# Patient Record
Sex: FEMALE | Race: BLACK OR AFRICAN AMERICAN | Employment: FULL TIME | URBAN - METROPOLITAN AREA
[De-identification: names, ages, dates, MRNs, and addresses within clinical notes are randomized per-mention and may not be internally consistent; named-entity substitution may affect disease eponyms.]

---

## 2017-06-05 RX ORDER — HYDROCHLOROTHIAZIDE 12.5 MG/1
CAPSULE ORAL
Qty: 90 CAP | Refills: 3 | Status: SHIPPED | OUTPATIENT
Start: 2017-06-05 | End: 2018-06-07 | Stop reason: SDUPTHER

## 2017-06-05 RX ORDER — METFORMIN HYDROCHLORIDE 500 MG/1
TABLET, EXTENDED RELEASE ORAL
Qty: 270 TAB | Refills: 3 | Status: SHIPPED | OUTPATIENT
Start: 2017-06-05 | End: 2018-07-06 | Stop reason: SDUPTHER

## 2017-06-05 RX ORDER — SPIRONOLACTONE 100 MG/1
TABLET, FILM COATED ORAL
Qty: 90 TAB | Refills: 3 | Status: SHIPPED | OUTPATIENT
Start: 2017-06-05 | End: 2018-06-17 | Stop reason: SDUPTHER

## 2017-09-01 DIAGNOSIS — E04.9 GOITER, NODULAR: ICD-10-CM

## 2017-09-01 DIAGNOSIS — R73.02 IMPAIRED GLUCOSE TOLERANCE: Primary | ICD-10-CM

## 2017-09-01 DIAGNOSIS — E78.5 HYPERLIPIDEMIA LDL GOAL <100: ICD-10-CM

## 2017-09-01 DIAGNOSIS — E28.2 PCOS (POLYCYSTIC OVARIAN SYNDROME): ICD-10-CM

## 2017-09-13 ENCOUNTER — OFFICE VISIT (OUTPATIENT)
Dept: ENDOCRINOLOGY | Age: 42
End: 2017-09-13

## 2017-09-13 VITALS
HEART RATE: 81 BPM | BODY MASS INDEX: 33.29 KG/M2 | HEIGHT: 65 IN | WEIGHT: 199.8 LBS | DIASTOLIC BLOOD PRESSURE: 88 MMHG | SYSTOLIC BLOOD PRESSURE: 141 MMHG

## 2017-09-13 DIAGNOSIS — E28.319 PREMATURE MENOPAUSE: ICD-10-CM

## 2017-09-13 DIAGNOSIS — E66.9 OBESITY (BMI 30-39.9): ICD-10-CM

## 2017-09-13 DIAGNOSIS — E78.5 HYPERLIPIDEMIA LDL GOAL <100: ICD-10-CM

## 2017-09-13 DIAGNOSIS — R94.6 ABNORMAL THYROID FUNCTION TEST: Primary | ICD-10-CM

## 2017-09-13 DIAGNOSIS — R73.02 IMPAIRED GLUCOSE TOLERANCE: ICD-10-CM

## 2017-09-13 DIAGNOSIS — E28.2 PCOS (POLYCYSTIC OVARIAN SYNDROME): ICD-10-CM

## 2017-09-13 RX ORDER — TOPIRAMATE 50 MG/1
TABLET, FILM COATED ORAL
Qty: 90 TAB | Refills: 3 | Status: SHIPPED | OUTPATIENT
Start: 2017-09-13 | End: 2018-10-31

## 2017-09-13 RX ORDER — PHENTERMINE HYDROCHLORIDE 37.5 MG/1
TABLET ORAL
Qty: 100 TAB | Refills: 1 | Status: SHIPPED | OUTPATIENT
Start: 2017-09-13 | End: 2018-10-31

## 2017-09-13 RX ORDER — MULTIVIT WITH MINERALS/HERBS
1 TABLET ORAL DAILY
COMMUNITY

## 2017-09-13 NOTE — PATIENT INSTRUCTIONS
1) Your Hemoglobin A1c is a 3 month marker of your blood sugar control. Normal is less than 5.7% and diabetes is greater than 6.4%. Your Hemoglobin A1c is 5.8% which means your blood sugar is still in the borderline diabetic range but under better control than last check when your value was 6.1%. Continue to work on your diet and exercise and take all your medications as directed. 2) Your LDL (bad cholesterol) is 138 down from 150. Try increasing the red yeast up to 3 tabs per day. 3) Your liver and kidney and thyroid are normal.    4) Your weight is down 9 lbs on our scales. Restart topamax 1 tab at bedtime and the phentermine 1 tab in the morning. 5) I will send you a reminder e-mail 3-4 weeks prior to your next visit and you will have the order already in the [facility] system so you can just go sometime in the 3-7 days before the next visit to have your labs drawn.

## 2017-09-13 NOTE — PROGRESS NOTES
Chief Complaint   Patient presents with    Thyroid Problem     pcp and pharmacy confirmed     History of Present Illness: Jeffrey Dunn is a 36 y.o. female here for follow up of thyroid. Weight down 9 lbs since last visit in 8/16 but was down 10 more lbs by 6/17 and has regained some of this back since then. Was taking a whole phentermine in the morning until she ran out in 8/17 and one whole tab of topamax at bedtime. Has been getting 3 tabs of metformin per day and 2x/week may get a 4th. Tolerating this well. Taking red yeast rice 2 tabs per day. Compliant with hctz and arthur. Still having some hair thinning. Still with some facial hair growth and acne that requires plucking every other day which is unchanged. Taking penny-pro and may have intermittent night sweats that sometimes can occur most days of the week. Just had labs drawn yesterday. Was aggravated today to explain her rise in BP as she has checked away from the office and seen reading under 297 systolic. 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 Current Outpatient Prescriptions   Medication Sig    hydroCHLOROthiazide (MICROZIDE) 12.5 mg capsule TAKE ONE CAPSULE BY MOUTH ONCE DAILY    spironolactone (ALDACTONE) 100 mg tablet TAKE ONE TABLET BY MOUTH ONCE DAILY. THIS IS A NEW HIGHER DOSE    metFORMIN ER (GLUCOPHAGE XR) 500 mg tablet TAKE THREE TABLETS BY MOUTH ONCE DAILY AT DINNER    PREMPRO 0.625-2.5 mg per tablet TAKE ONE TABLET BY MOUTH ONCE DAILY    hydrOXYzine (VISTARIL) 25 mg capsule Take 1 cap at bedtime as needed for sleep    red yeast rice extract 600 mg cap Take 1,200 mg by mouth daily.  OTHER,NON-FORMULARY, 1,000 mg. Flax seed    CALCIUM & MAGNESIUM CARBONATES PO Take  by mouth two (2) times a day.  aspirin (ASPIRIN) 325 mg tablet Take 325 mg by mouth daily.  amlodipine (NORVASC) 10 mg tablet Take  by mouth daily.  ASPIRIN/ACETAMINOPHEN/CAFFEINE (EXCEDRIN MIGRAINE PO) Take  by mouth as needed.     phentermine (ADIPEX-P) 37.5 mg tablet Take 1 tablet before breakfast    topiramate (TOPAMAX) 50 mg tablet Take 1 tablet at bedtime -- Dose change 8/24/16--updated med list--did not send prescription to the pharmacy     No current facility-administered medications for this visit. Allergies   Allergen Reactions    Macrobid [Nitrofurantoin Monohyd/M-Cryst] Rash and Itching     Review of Systems:  - Cardiovascular: no chest pain  - Neurological: no tremors  - Integumentary: skin is normal    Physical Examination:  Blood pressure 141/88, pulse 81, height 5' 5\" (1.651 m), weight 199 lb 12.8 oz (90.6 kg), last menstrual period 10/11/2012.  - General: pleasant, no distress, good eye contact   - Neck: small goiter, no carotid bruits  - Cardiovascular: regular, normal rate, nl s1 and s2, no m/r/g   - Respiratory: clear to auscultation bilaterally   - Integumentary: skin is normal, no edema  - Neurological: reflexes 2+ at biceps, no tremors  - Psychiatric: normal mood and affect    Data Reviewed:   Component      Latest Ref Rng & Units 9/12/2017 9/12/2017 9/12/2017 9/12/2017           9:12 AM  9:12 AM  9:12 AM  9:12 AM   Glucose      65 - 99 mg/dL  98     BUN      6 - 24 mg/dL  12     Creatinine      0.57 - 1.00 mg/dL  0.92     GFR est non-AA      >59 mL/min/1.73  77     GFR est AA      >59 mL/min/1.73  88     BUN/Creatinine ratio      9 - 23  13     Sodium      134 - 144 mmol/L  139     Potassium      3.5 - 5.2 mmol/L  4.3     Chloride      96 - 106 mmol/L  99     CO2      18 - 29 mmol/L  26     Calcium      8.7 - 10.2 mg/dL  9.5     Protein, total      6.0 - 8.5 g/dL  7.7     Albumin      3.5 - 5.5 g/dL  4.1     GLOBULIN, TOTAL      1.5 - 4.5 g/dL  3.6     A-G Ratio      1.2 - 2.2  1.1 (L)     Bilirubin, total      0.0 - 1.2 mg/dL  0.3     Alk.  phosphatase      39 - 117 IU/L  63     AST      0 - 40 IU/L  12     ALT (SGPT)      0 - 32 IU/L  12     Cholesterol, total      100 - 199 mg/dL   211 (H)    Triglyceride      0 - 149 mg/dL   82    HDL Cholesterol      >39 mg/dL   57    VLDL, calculated      5 - 40 mg/dL   16    LDL, calculated      0 - 99 mg/dL   138 (H)    Hemoglobin A1c, (calculated)      4.8 - 5.6 % 5.8 (H)      Estimated average glucose      mg/dL 120      TSH      0.450 - 4.500 uIU/mL    0.570     Component      Latest Ref Rng & Units 9/12/2017           9:12 AM   Testosterone, Serum (Total)      ng/dL 12.6         Assessment/Plan:     1. Abnormal thyroid function test: She had a low free T4 in the setting of a low TSH in August 2010 but on repeat her TSH was normal in Dec 2010 and July 2011. She had another low TSH of 0.37 in May 2012 but this was normal on repeat in August 2012 and 8/13. Then low again at 0.38 in 10/13. Up to 0.425 in 4/14 but down to 0.29 in 9/14. Up to 0.48 in 2/15 and 0.52 in 12/15 and 0.58 in 8/16 and 0.57 in 9/17. I think her low TSH values were either lab errors or possibly from stress rather than from hyperthyroidism as she doesn't have any symptoms of hyperthyroidism.   - repeat TSH prior to next visit      2. PCOS (polycystic ovarian syndrome): I thought a lot of her symptoms of irregular periods and hair growth may be from PCOS so I began treatment for this with Metformin in May 2012. She is tolerating this so far at 3 tabs daily so will stay at this dose. Increased the arthur from 25 to 50 daily in 8/12 to help with hair growth but this hasn't made too much difference so increased to 100 in 9/14 but still hasn't helped too much. Testosterone was 34 in 8/12 and 35 in 3/12 and 34 in 8/13 and 26 in 9/13 and 14.6 in 9/14 and 21 in 12/15 and 12.6 in 9/17.  - cont Metformin  mg 3 tabs at dinner   - cont spironolactone 100 mg daily,   - check total testosterone prior to next visit      3. Other and unspecified hyperlipidemia: Given PCOS, Goal LDL < 100, non-HDL < 130, and TG < 150.  in May 2012.   Increased simva from 20 to 40 at that time but  in 8/12 as she had been non-compliant with this.  in 8/13 but down to 132 in 10/13 and 130 in 4/14 and 141 in 9/14 and 151 in 2/15 and 146 in 12/15. Has been on red yeast rice 1200 mg per day the past 2 months but LDL still 150 in 8/16 but down to 138 in 9/17 with wt loss. Will try to go up to 1800 mg per day  - increase red yeast rice to 1800 mg daily  - check lipids and cmp prior to next visit         4. Impaired glucose tolerance:  Her A1c was 6% in 8/12 and stable at 6% in 8/13 but up to 6.1% in 10/13. Down to 6% in 4/14 and stable in 9/14. Up to 6.2% in 2/15 but down to 6% in 12/15. Up to 6.1% in 8/16 but down to 5.8% in 9/17 with wt loss. - check A1c and cmp prior to next visit  - cont metformin as above    5. Obesity:  Had lost 9 lbs from 10/13 to 4/14 with P37 but stopped for the next 4 months and weight up 1 lb by 2/15 and 16 lbs by 12/15. Down 6 lbs by 4/16 so added topamax. Has been under more stress and had gained 8 lbs back by 8/16 so decrease topamax back to 1 tab daily and added back phentermine and wt down 9 lbs by 9/17  - cont topamax 50 mg at bedtime   - cont phentermine 1 tab daily    6. Premature ovarian failure: has documented high LH/FSH levels in 12/10 and apparently her OB checked them again and confirmed this. I think given her early age of going into menopause, she needs to be on estrogen-progesterone combo to help protect her bones over the next 10 years. I started prempro in 7/13. Switched to generic estrace and provera in 9/14 for cost but didn't feel as well so went back to penny-pro. May have had worsening depression and anxiety in the fall of 2015 due to being off this for a period of 2 months as her mood is better with being back on this. - cont prempro 0.625/2.5 mg daily      Patient Instructions   1) Your Hemoglobin A1c is a 3 month marker of your blood sugar control. Normal is less than 5.7% and diabetes is greater than 6.4%.   Your Hemoglobin A1c is 5.8% which means your blood sugar is still in the borderline diabetic range but under better control than last check when your value was 6.1%. Continue to work on your diet and exercise and take all your medications as directed. 2) Your LDL (bad cholesterol) is 138 down from 150. Try increasing the red yeast up to 3 tabs per day. 3) Your liver and kidney and thyroid are normal.    4) Your weight is down 9 lbs on our scales. Restart topamax 1 tab at bedtime and the phentermine 1 tab in the morning. 5) I will send you a reminder e-mail 3-4 weeks prior to your next visit and you will have the order already in the [facility] system so you can just go sometime in the 3-7 days before the next visit to have your labs drawn. Follow-up Disposition:  Return in about 8 months (around 5/13/2018).     Copy sent to:  Dr. Carl Herb  Dr. Bubba Ramirez (OB/GYN)

## 2017-09-13 NOTE — MR AVS SNAPSHOT
Visit Information Date & Time Provider Department Dept. Phone Encounter #  
 9/13/2017  2:10 PM Dayne Rodríguez, [address] [facility] [id number] Follow-up Instructions Return in about 8 months (around 5/13/2018). Upcoming Health Maintenance Date Due DTaP/Tdap/Td series (1 - Tdap) 3/7/1995 PAP AKA CERVICAL CYTOLOGY 3/7/1995 INFLUENZA AGE 9 TO ADULT 8/1/2017 Allergies as of 9/13/2017  Review Complete On: 9/13/2017 By: Dayne Rodríguez MD  
  
 Severity Noted Reaction Type Reactions Macrobid [Nitrofurantoin Monohyd/m-cryst]  07/16/2010    Rash, Itching Current Immunizations  Never Reviewed No immunizations on file. Not reviewed this visit Vitals BP Pulse Height(growth percentile) Weight(growth percentile) LMP BMI  
 141/88 81 5' 5\" (1.651 m) 199 lb 12.8 oz (90.6 kg) 10/11/2012 33.25 kg/m2 OB Status Smoking Status Postmenopausal Never Smoker Vitals History BMI and BSA Data Body Mass Index Body Surface Area  
 33.25 kg/m 2 2.04 m 2 Preferred Pharmacy Pharmacy Name Phone [facility] [address] [place] [address] [phone] Your Updated Medication List  
  
   
This list is accurate as of: 9/13/17  2:47 PM.  Always use your most recent med list.  
  
  
  
  
 aspirin 325 mg tablet Commonly known as:  ASPIRIN Take 325 mg by mouth daily. CALCIUM & MAGNESIUM CARBONATES PO Take  by mouth two (2) times a day. estrogen (conjugated)-medroxyPROGESTERone 0.625-2.5 mg per tablet Commonly known as:  Jearline Sauger TAKE ONE TABLET BY MOUTH ONCE DAILY EXCEDRIN MIGRAINE PO Take  by mouth as needed. hydroCHLOROthiazide 12.5 mg capsule Commonly known as:  Bryce Devoid TAKE ONE CAPSULE BY MOUTH ONCE DAILY  
  
 hydrOXYzine pamoate 25 mg capsule Commonly known as:  VISTARIL Take 1 cap at bedtime as needed for sleep metFORMIN  mg tablet Commonly known as:  GLUCOPHAGE XR  
TAKE THREE TABLETS BY MOUTH ONCE DAILY AT DINNER  
  
 NORVASC 10 mg tablet Generic drug:  amLODIPine Take  by mouth daily. OTHER(NON-FORMULARY) 1,000 mg. Flax seed  
  
 phentermine 37.5 mg tablet Commonly known as:  ADIPEX-P Take 1 tablet before breakfast  
  
 red yeast rice extract 600 mg Cap Take 1,800 mg by mouth daily. spironolactone 100 mg tablet Commonly known as:  ALDACTONE  
TAKE ONE TABLET BY MOUTH ONCE DAILY. THIS IS A NEW HIGHER DOSE  
  
 topiramate 50 mg tablet Commonly known as:  TOPAMAX Take 1 tablet at bedtime Prescriptions Printed Refills  
 phentermine (ADIPEX-P) 37.5 mg tablet 1 Sig: Take 1 tablet before breakfast  
 Class: Print Prescriptions Sent to Pharmacy Refills  
 topiramate (TOPAMAX) 50 mg tablet 3 Sig: Take 1 tablet at bedtime Class: Normal  
 Pharmacy: [id number] [place] [address] Ph #: [phone]  
 estrogen, conjugated,-medroxyPROGESTERone (PREMPRO) 0.625-2.5 mg per tablet 4 Sig: TAKE ONE TABLET BY MOUTH ONCE DAILY Class: Normal  
 Pharmacy: [id number] [place] [address] Ph #: [phone] Follow-up Instructions Return in about 8 months (around 5/13/2018). Patient Instructions 1) Your Hemoglobin A1c is a 3 month marker of your blood sugar control. Normal is less than 5.7% and diabetes is greater than 6.4%. Your Hemoglobin A1c is 5.8% which means your blood sugar is still in the borderline diabetic range but under better control than last check when your value was 6.1%. Continue to work on your diet and exercise and take all your medications as directed. 2) Your LDL (bad cholesterol) is 138 down from 150. Try increasing the red yeast up to 3 tabs per day.  
 
3) Your liver and kidney and thyroid are normal. 
 
 4) Your weight is down 9 lbs on our scales. Restart topamax 1 tab at bedtime and the phentermine 1 tab in the morning. 5) I will send you a reminder e-mail 3-4 weeks prior to your next visit and you will have the order already in the [facility] system so you can just go sometime in the 3-7 days before the next visit to have your labs drawn. Introducing [facility] & [facility] SERVICES! Dear Natalie Body: Thank you for requesting a [employer] account. Our records indicate that you already have an active [employer] account. You can access your account anytime at [email or web address] Did you know that you can access your hospital and ER discharge instructions at any time in [employer]? You can also review all of your test results from your hospital stay or ER visit. Additional Information If you have questions, please visit the Frequently Asked Questions section of the [employer] website at [email or web address] Remember, [employer] is NOT to be used for urgent needs. For medical emergencies, dial 911. Now available from your iPhone and Android! Please provide this summary of care documentation to your next provider. Your primary care clinician is listed as Timothy Damian. If you have any questions after today's visit, please call [phone].

## 2017-09-14 LAB
ALBUMIN SERPL-MCNC: 4.1 G/DL (ref 3.5–5.5)
ALBUMIN/GLOB SERPL: 1.1 {RATIO} (ref 1.2–2.2)
ALP SERPL-CCNC: 63 IU/L (ref 39–117)
ALT SERPL-CCNC: 12 IU/L (ref 0–32)
AST SERPL-CCNC: 12 IU/L (ref 0–40)
BILIRUB SERPL-MCNC: 0.3 MG/DL (ref 0–1.2)
BUN SERPL-MCNC: 12 MG/DL (ref 6–24)
BUN/CREAT SERPL: 13 (ref 9–23)
CALCIUM SERPL-MCNC: 9.5 MG/DL (ref 8.7–10.2)
CHLORIDE SERPL-SCNC: 99 MMOL/L (ref 96–106)
CHOLEST SERPL-MCNC: 211 MG/DL (ref 100–199)
CO2 SERPL-SCNC: 26 MMOL/L (ref 18–29)
CREAT SERPL-MCNC: 0.92 MG/DL (ref 0.57–1)
EST. AVERAGE GLUCOSE BLD GHB EST-MCNC: 120 MG/DL
GLOBULIN SER CALC-MCNC: 3.6 G/DL (ref 1.5–4.5)
GLUCOSE SERPL-MCNC: 98 MG/DL (ref 65–99)
HBA1C MFR BLD: 5.8 % (ref 4.8–5.6)
HDLC SERPL-MCNC: 57 MG/DL
INTERPRETATION, 910389: NORMAL
LDLC SERPL CALC-MCNC: 138 MG/DL (ref 0–99)
POTASSIUM SERPL-SCNC: 4.3 MMOL/L (ref 3.5–5.2)
PROT SERPL-MCNC: 7.7 G/DL (ref 6–8.5)
SODIUM SERPL-SCNC: 139 MMOL/L (ref 134–144)
TESTOST SERPL-MCNC: 12.6 NG/DL
TRIGL SERPL-MCNC: 82 MG/DL (ref 0–149)
TSH SERPL DL<=0.005 MIU/L-ACNC: 0.57 UIU/ML (ref 0.45–4.5)
VLDLC SERPL CALC-MCNC: 16 MG/DL (ref 5–40)

## 2018-04-13 DIAGNOSIS — E78.5 HYPERLIPIDEMIA LDL GOAL <100: ICD-10-CM

## 2018-04-13 DIAGNOSIS — R94.6 ABNORMAL THYROID FUNCTION TEST: ICD-10-CM

## 2018-04-13 DIAGNOSIS — E66.9 OBESITY (BMI 30-39.9): ICD-10-CM

## 2018-04-13 DIAGNOSIS — R73.02 IMPAIRED GLUCOSE TOLERANCE: ICD-10-CM

## 2018-04-13 DIAGNOSIS — E28.2 PCOS (POLYCYSTIC OVARIAN SYNDROME): ICD-10-CM

## 2018-04-13 DIAGNOSIS — E28.319 PREMATURE MENOPAUSE: ICD-10-CM

## 2018-06-07 RX ORDER — HYDROCHLOROTHIAZIDE 12.5 MG/1
CAPSULE ORAL
Qty: 90 CAP | Refills: 3 | Status: SHIPPED | OUTPATIENT
Start: 2018-06-07 | End: 2019-08-18 | Stop reason: SDUPTHER

## 2018-07-07 RX ORDER — METFORMIN HYDROCHLORIDE 500 MG/1
TABLET, EXTENDED RELEASE ORAL
Qty: 90 TAB | Refills: 11 | Status: SHIPPED | OUTPATIENT
Start: 2018-07-07 | End: 2019-09-23 | Stop reason: SDUPTHER

## 2018-08-13 RX ORDER — SPIRONOLACTONE 100 MG/1
TABLET, FILM COATED ORAL
Qty: 90 TAB | Refills: 3 | Status: SHIPPED | OUTPATIENT
Start: 2018-08-13 | End: 2019-12-03 | Stop reason: SDUPTHER

## 2018-10-31 ENCOUNTER — OFFICE VISIT (OUTPATIENT)
Dept: ENDOCRINOLOGY | Age: 43
End: 2018-10-31

## 2018-10-31 VITALS
BODY MASS INDEX: 33.69 KG/M2 | SYSTOLIC BLOOD PRESSURE: 132 MMHG | DIASTOLIC BLOOD PRESSURE: 82 MMHG | HEIGHT: 65 IN | HEART RATE: 70 BPM | WEIGHT: 202.2 LBS

## 2018-10-31 DIAGNOSIS — R73.02 IMPAIRED GLUCOSE TOLERANCE: ICD-10-CM

## 2018-10-31 DIAGNOSIS — E28.2 PCOS (POLYCYSTIC OVARIAN SYNDROME): ICD-10-CM

## 2018-10-31 DIAGNOSIS — R94.6 ABNORMAL THYROID FUNCTION TEST: Primary | ICD-10-CM

## 2018-10-31 DIAGNOSIS — E28.319 PREMATURE MENOPAUSE: ICD-10-CM

## 2018-10-31 DIAGNOSIS — E78.5 HYPERLIPIDEMIA LDL GOAL <100: ICD-10-CM

## 2018-10-31 DIAGNOSIS — E66.9 OBESITY (BMI 30-39.9): ICD-10-CM

## 2018-10-31 RX ORDER — TOPIRAMATE 50 MG/1
TABLET, FILM COATED ORAL
Qty: 90 TAB | Refills: 3 | Status: SHIPPED | OUTPATIENT
Start: 2018-10-31 | End: 2020-04-08 | Stop reason: SDUPTHER

## 2018-10-31 RX ORDER — PHENTERMINE HYDROCHLORIDE 37.5 MG/1
TABLET ORAL
Qty: 90 TAB | Refills: 1 | Status: SHIPPED | OUTPATIENT
Start: 2018-10-31 | End: 2019-04-17

## 2018-10-31 NOTE — PATIENT INSTRUCTIONS
1) I will send you a message through [facility] with your lab results. 2) I will send the topamax to the pharmacy today. 3) Continue 1/2 tab of phentermine every other day for weight. 4) I will mail you a lab slip to repeat your labs prior to the next visit.

## 2018-10-31 NOTE — PROGRESS NOTES
Chief Complaint   Patient presents with    Thyroid Problem     pcp and pharmacy confirmed   [facility]     patient had them drawn today. History of Present Illness: Leila Padilla is a 39 y.o. female here for follow up of thyroid. Weight up 3 lbs since last visit in 9/17. Was taking red yeast 2 caps per day until running out about a month ago and has not bought any more. Was taken off amlodipine in the summer and has checked off this and seen in the 130s/80s. Still taking arthur 100 mg daily and hctz 25 mg daily. Has been taking phentermine 1/2 tab every other day and 1 tab of topamax at night until running out of topamax about a month ago. Still taking penny-pro daily. Mostly taking 3 tabs of metformin per day and rarely may take a 4th if she ate more carbs. Had labs drawn this morning that are pending. Current Outpatient Medications   Medication Sig    phentermine (ADIPEX-P) 37.5 mg tablet Take 1/2 tablet before breakfast every other day    estrogen, conjugated,-medroxyPROGESTERone (PREMPRO) 0.625-2.5 mg per tablet TAKE ONE TABLET BY MOUTH ONCE DAILY    spironolactone (ALDACTONE) 100 mg tablet TAKE ONE TABLET BY MOUTH ONCE DAILY    metFORMIN ER (GLUCOPHAGE XR) 500 mg tablet TAKE THREE TABLETS BY MOUTH ONCE DAILY AT DINNER    hydroCHLOROthiazide (MICROZIDE) 12.5 mg capsule TAKE ONE CAPSULE BY MOUTH ONCE DAILY    b complex vitamins tablet Take 1 Tab by mouth daily.  OTHER,NON-FORMULARY, 1,000 mg. Flax seed    CALCIUM & MAGNESIUM CARBONATES PO Take  by mouth two (2) times a day.  ASPIRIN/ACETAMINOPHEN/CAFFEINE (EXCEDRIN MIGRAINE PO) Take  by mouth as needed.  red yeast rice extract 600 mg cap Take 1,800 mg by mouth daily. No current facility-administered medications for this visit.       Allergies   Allergen Reactions    Macrobid [Nitrofurantoin Monohyd/M-Cryst] Rash and Itching     Review of Systems:  - Cardiovascular: no chest pain  - Neurological: no tremors  - Integumentary: skin is normal    Physical Examination:  Blood pressure 132/82, pulse 70, height 5' 5\" (1.651 m), weight 202 lb 3.2 oz (91.7 kg), last menstrual period 10/11/2012.  - General: pleasant, no distress, good eye contact   - Neck: no carotid bruits  - Cardiovascular: regular, normal rate, nl s1 and s2, no m/r/g   - Respiratory: clear to auscultation bilaterally   - Integumentary: skin is normal, no edema  - Neurological: reflexes 2+ at biceps, no tremors  - Psychiatric: normal mood and affect    Data Reviewed:   - none new for review    Assessment/Plan:     1. Abnormal thyroid function test: She had a low free T4 in the setting of a low TSH in August 2010 but on repeat her TSH was normal in Dec 2010 and July 2011. She had another low TSH of 0.37 in May 2012 but this was normal on repeat in August 2012 and 8/13. Then low again at 0.38 in 10/13. Up to 0.425 in 4/14 but down to 0.29 in 9/14. Up to 0.48 in 2/15 and 0.52 in 12/15 and 0.58 in 8/16 and 0.57 in 9/17. I think her low TSH values were either lab errors or possibly from stress rather than from hyperthyroidism as she doesn't have any symptoms of hyperthyroidism.   - repeat TSH prior to next visit      2. PCOS (polycystic ovarian syndrome): I thought a lot of her symptoms of irregular periods and hair growth may be from PCOS so I began treatment for this with Metformin in May 2012. She is tolerating this so far at 3 tabs daily so will stay at this dose. Increased the arthur from 25 to 50 daily in 8/12 to help with hair growth but this hasn't made too much difference so increased to 100 in 9/14 but still hasn't helped too much. Testosterone was 34 in 8/12 and 35 in 3/12 and 34 in 8/13 and 26 in 9/13 and 14.6 in 9/14 and 21 in 12/15 and 12.6 in 9/17.  - cont Metformin  mg 3 tabs at dinner   - cont spironolactone 100 mg daily,   - check total testosterone prior to next visit      3.  Other and unspecified hyperlipidemia: Given PCOS, Goal LDL < 100, non-HDL < 130, and TG < 150.  in May 2012. Increased simva from 20 to 40 at that time but  in 8/12 as she had been non-compliant with this.  in 8/13 but down to 132 in 10/13 and 130 in 4/14 and 141 in 9/14 and 151 in 2/15 and 146 in 12/15. Has been on red yeast rice 1200 mg per day the past 2 months but LDL still 150 in 8/16 but down to 138 in 9/17 with wt loss. Wanted her to go up to 3 tabs per day but only was taking 2 per day until 1 month ago and then ran out and never refilled. - off red yeast rice 1800 mg daily  - check lipids and cmp prior to next visit         4. Impaired glucose tolerance:  Her A1c was 6% in 8/12 and stable at 6% in 8/13 but up to 6.1% in 10/13. Down to 6% in 4/14 and stable in 9/14. Up to 6.2% in 2/15 but down to 6% in 12/15. Up to 6.1% in 8/16 but down to 5.8% in 9/17 with wt loss. - check A1c and cmp prior to next visit  - cont metformin as above    5. Obesity:  Had lost 9 lbs from 10/13 to 4/14 with P37 but stopped for the next 4 months and weight up 1 lb by 2/15 and 16 lbs by 12/15. Down 6 lbs by 4/16 so added topamax. Has been under more stress and had gained 8 lbs back by 8/16 so decreased topamax back to 1 tab daily and added back phentermine and wt down 9 lbs by 9/17. Up 3 lbs by 10/18 but has been off topamax for the past month. - cont topamax 50 mg at bedtime   - cont phentermine 1 tab daily    6. Premature ovarian failure: has documented high LH/FSH levels in 12/10 and apparently her OB checked them again and confirmed this. I think given her early age of going into menopause, she needs to be on estrogen-progesterone combo to help protect her bones over the next 10 years. I started prempro in 7/13. Switched to generic estrace and provera in 9/14 for cost but didn't feel as well so went back to penny-pro.   May have had worsening depression and anxiety in the fall of 2015 due to being off this for a period of 2 months as her mood is better with being back on this.  - cont prempro 0.625/2.5 mg daily      Patient Instructions   1) I will send you a message through [employer] with your lab results. 2) I will send the topamax to the pharmacy today. 3) Continue 1/2 tab of phentermine every other day for weight. 4) I will mail you a lab slip to repeat your labs prior to the next visit. Follow-up Disposition:  Return in about 6 months (around 4/30/2019). Copy sent to:  Dr. Claudette Zhu    Lab follow up: 11/4/18  Component      Latest Ref Rng & Units 10/31/2018 10/31/2018 10/31/2018 10/31/2018           8:56 AM  8:56 AM  8:56 AM  8:56 AM   Glucose      65 - 99 mg/dL   99    BUN      6 - 24 mg/dL   8    Creatinine      0.57 - 1.00 mg/dL   1.05 (H)    GFR est non-AA      >59 mL/min/1.73   65    GFR est AA      >59 mL/min/1.73   75    BUN/Creatinine ratio      9 - 23   8 (L)    Sodium      134 - 144 mmol/L   139    Potassium      3.5 - 5.2 mmol/L   5.1    Chloride      96 - 106 mmol/L   100    CO2      20 - 29 mmol/L   25    Calcium      8.7 - 10.2 mg/dL   9.9    Protein, total      6.0 - 8.5 g/dL   8.0    Albumin      3.5 - 5.5 g/dL   4.3    GLOBULIN, TOTAL      1.5 - 4.5 g/dL   3.7    A-G Ratio      1.2 - 2.2   1.2    Bilirubin, total      0.0 - 1.2 mg/dL   0.4    Alk. phosphatase      39 - 117 IU/L   66    AST      0 - 40 IU/L   16    ALT (SGPT)      0 - 32 IU/L   9    Cholesterol, total      100 - 199 mg/dL  208 (H)     Triglyceride      0 - 149 mg/dL  65     HDL Cholesterol      >39 mg/dL  56     VLDL, calculated      5 - 40 mg/dL  13     LDL, calculated      0 - 99 mg/dL  139 (H)     Hemoglobin A1c, (calculated)      4.8 - 5.6 %    5.9 (H)   Estimated average glucose      mg/dL    123   TSH      0.450 - 4.500 uIU/mL 0.491        Sent her the following message through [employer]:    Hemoglobin A1c is a 3 month marker of your blood sugar control. Normal is less than 5.7% and diabetes is greater than 6.4%.   Your Hemoglobin A1c is 5.9% which means your blood sugar is still in the borderline diabetic range but under slightly worse control than last check when your value was 5.8%. Continue to work on your diet and exercise and take all your medications as directed.  -------------------------------------------------------------------------------------------------------------------  Total Cholesterol is the total number of cholesterol particles in your blood. Goal is less than 200. Your value is above goal.    Triglycerides are the short term fats in your blood. Goal is less than 150. Your value is at goal.    HDL is the good cholesterol in your blood. Goal is more than 50. Your value is at goal.    LDL is the bad cholesterol in your blood. Goal is less than 100. Your value is above goal.  I think rather than go back on red yeast rice, we should have you go on a low dose of rosuvastatin (generic crestor) 5 mg 1 tab at bedtime which is much more likely to get your cholesterol to goal and requires less pills per day. I sent this to the pharmacy today. Watch out for any muscle aches or cramps with this medication and let me know if they develop. We will watch your liver tests very carefully while on this medication. Continue to follow a low cholesterol diet. Try to limit the amount of fried foods, fatty foods, butter, gravy, red meat, ice cream, cheese, and eggs in your diet, which are all high in cholesterol. Take all of your medications (crestor) as directed.  -------------------------------------------------------------------------------------------------------------------  BUN and creatinine are markers of kidney function. Your creatine is just barely abnormal likely due to mild dehydration. Drink at least 4 8oz glasses of water everyday to stay hydrated to prevent your creatinine level from going higher. -------------------------------------------------------------------------------------------------------------------  ALT and AST are markers of liver function. Your values are normal.  -------------------------------------------------------------------------------------------------------------------  TSH is a thyroid test.  Your level is normal so you don't have any problem with your thyroid at this time that needs further evaluation or treatment.   -------------------------------------------------------------------------------------------------------------------  I will mail you a lab slip to repeat your labs prior to your next visit.

## 2018-10-31 NOTE — LETTER
4/8/2019 8:49 AM 
 
Ms. Laura Abreu  
[address] [facility] [phone] Please go to [facility] and have your labs repeated sometime in the 1-2 weeks prior to your upcoming visit with me. Johny Campos to allow at least 2 days at the minimum to ensure I get the results in time for your visit. Sandra Hernandez order is already in their system and be sure to ask to have labs drawn that are under Dr. Lesa Amaral name. Bisi Jiménez you have the actual lab order that I may have given you (check your glove compartment or other safe spot where you may keep your medical papers), please bring this to the lab just to be safe in case [employer]'s computer system is down. Terrence Quesada will review the results at your follow up visit. Please fast for your labs.  Don't eat anything after midnight.   
 
 
 
 
 
Sincerely, 
 
 
Isaura Rivera MD

## 2018-11-01 LAB
ALBUMIN SERPL-MCNC: 4.3 G/DL (ref 3.5–5.5)
ALBUMIN/GLOB SERPL: 1.2 {RATIO} (ref 1.2–2.2)
ALP SERPL-CCNC: 66 IU/L (ref 39–117)
ALT SERPL-CCNC: 9 IU/L (ref 0–32)
AST SERPL-CCNC: 16 IU/L (ref 0–40)
BILIRUB SERPL-MCNC: 0.4 MG/DL (ref 0–1.2)
BUN SERPL-MCNC: 8 MG/DL (ref 6–24)
BUN/CREAT SERPL: 8 (ref 9–23)
CALCIUM SERPL-MCNC: 9.9 MG/DL (ref 8.7–10.2)
CHLORIDE SERPL-SCNC: 100 MMOL/L (ref 96–106)
CHOLEST SERPL-MCNC: 208 MG/DL (ref 100–199)
CO2 SERPL-SCNC: 25 MMOL/L (ref 20–29)
CREAT SERPL-MCNC: 1.05 MG/DL (ref 0.57–1)
EST. AVERAGE GLUCOSE BLD GHB EST-MCNC: 123 MG/DL
GLOBULIN SER CALC-MCNC: 3.7 G/DL (ref 1.5–4.5)
GLUCOSE SERPL-MCNC: 99 MG/DL (ref 65–99)
HBA1C MFR BLD: 5.9 % (ref 4.8–5.6)
HDLC SERPL-MCNC: 56 MG/DL
LDLC SERPL CALC-MCNC: 139 MG/DL (ref 0–99)
POTASSIUM SERPL-SCNC: 5.1 MMOL/L (ref 3.5–5.2)
PROT SERPL-MCNC: 8 G/DL (ref 6–8.5)
SODIUM SERPL-SCNC: 139 MMOL/L (ref 134–144)
TRIGL SERPL-MCNC: 65 MG/DL (ref 0–149)
TSH SERPL DL<=0.005 MIU/L-ACNC: 0.49 UIU/ML (ref 0.45–4.5)
VLDLC SERPL CALC-MCNC: 13 MG/DL (ref 5–40)

## 2018-11-04 DIAGNOSIS — R73.02 IMPAIRED GLUCOSE TOLERANCE: ICD-10-CM

## 2018-11-04 DIAGNOSIS — E78.5 HYPERLIPIDEMIA LDL GOAL <100: ICD-10-CM

## 2018-11-04 DIAGNOSIS — E28.2 PCOS (POLYCYSTIC OVARIAN SYNDROME): Primary | ICD-10-CM

## 2018-11-04 RX ORDER — ROSUVASTATIN CALCIUM 5 MG/1
5 TABLET, COATED ORAL
Qty: 90 TAB | Refills: 3 | Status: SHIPPED | OUTPATIENT
Start: 2018-11-04 | End: 2019-04-17 | Stop reason: SDUPTHER

## 2018-11-06 ENCOUNTER — TELEPHONE (OUTPATIENT)
Dept: ENDOCRINOLOGY | Age: 43
End: 2018-11-06

## 2018-11-06 NOTE — TELEPHONE ENCOUNTER
Please call pt to let her know she has an unread message in [address] Hemoglobin A1c is a 3 month marker of your blood sugar control.  Normal is less than 5.7% and diabetes is greater than 6.4%.  Your Hemoglobin A1c is 5.9% which means your blood sugar is still in the borderline diabetic range but under slightly worse control than last check when your value was 5.8%.  Continue to work on your diet and exercise and take all your medications as directed.   -------------------------------------------------------------------------------------------------------------------   Total Cholesterol is the total number of cholesterol particles in your blood.  Goal is less than 200.  Your value is above goal.     Triglycerides are the short term fats in your blood.  Goal is less than 150.  Your value is at goal.     HDL is the good cholesterol in your blood.  Goal is more than 50.  Your value is at goal.     LDL is the bad cholesterol in your blood.  Goal is less than 100.  Your value is above goal.  I think rather than go back on red yeast rice, we should have you go on a low dose of rosuvastatin (generic crestor) 5 mg 1 tab at bedtime which is much more likely to get your cholesterol to goal and requires less pills per day.  I sent this to the pharmacy today. Candy Monica out for any muscle aches or cramps with this medication and let me know if they develop.  We will watch your liver tests very carefully while on this medication. Continue to follow a low cholesterol diet.  Try to limit the amount of fried foods, fatty foods, butter, gravy, red meat, ice cream, cheese, and eggs in your diet, which are all high in cholesterol.  Take all of your medications (crestor) as directed.   -------------------------------------------------------------------------------------------------------------------   BUN and creatinine are markers of kidney function.  Your creatine is just barely abnormal likely due to mild dehydration.  Drink at least 4 8oz glasses of water everyday to stay hydrated to prevent your creatinine level from going higher. -------------------------------------------------------------------------------------------------------------------   ALT and AST are markers of liver function.  Your values are normal.   -------------------------------------------------------------------------------------------------------------------   TSH is a thyroid test.  Your level is normal so you don't have any problem with your thyroid at this time that needs further evaluation or treatment.   -------------------------------------------------------------------------------------------------------------------   I will mail you a lab slip to repeat your labs prior to your next visit.

## 2018-11-06 NOTE — TELEPHONE ENCOUNTER
I attempted to call Ms. Beltran and reached her voice mail with a recording saying that the mail box was full. I will try again later.   Pankaj Alcantara

## 2019-04-17 ENCOUNTER — OFFICE VISIT (OUTPATIENT)
Dept: ENDOCRINOLOGY | Age: 44
End: 2019-04-17

## 2019-04-17 VITALS
SYSTOLIC BLOOD PRESSURE: 130 MMHG | HEART RATE: 66 BPM | DIASTOLIC BLOOD PRESSURE: 84 MMHG | BODY MASS INDEX: 34.65 KG/M2 | WEIGHT: 208.2 LBS

## 2019-04-17 DIAGNOSIS — E28.319 PREMATURE MENOPAUSE: ICD-10-CM

## 2019-04-17 DIAGNOSIS — E78.5 HYPERLIPIDEMIA LDL GOAL <100: ICD-10-CM

## 2019-04-17 DIAGNOSIS — R73.02 IMPAIRED GLUCOSE TOLERANCE: ICD-10-CM

## 2019-04-17 DIAGNOSIS — E28.2 PCOS (POLYCYSTIC OVARIAN SYNDROME): ICD-10-CM

## 2019-04-17 DIAGNOSIS — R94.6 ABNORMAL THYROID FUNCTION TEST: Primary | ICD-10-CM

## 2019-04-17 DIAGNOSIS — E66.9 OBESITY (BMI 30-39.9): ICD-10-CM

## 2019-04-17 RX ORDER — ROSUVASTATIN CALCIUM 5 MG/1
5 TABLET, COATED ORAL
Qty: 90 TAB | Refills: 3 | Status: SHIPPED | OUTPATIENT
Start: 2019-04-17 | End: 2020-04-08 | Stop reason: SDUPTHER

## 2019-04-17 NOTE — PATIENT INSTRUCTIONS
1) I will send you a message through [employer] with your lab results. 2) Plan on starting crestor 5 mg 1 tab at night for cholesterol. Watch out for any muscle aches or cramps with this medication and let me know if they develop. We will watch your liver tests very carefully while on this medication. 3) Your blood pressure is at goal. 
 
4) I will mail you a lab slip. Please put this in the glove compartment and I will send you a reminder to have your labs drawn prior to the next visit.

## 2019-04-17 NOTE — PROGRESS NOTES
Chief Complaint Patient presents with  Thyroid Problem  
  pcp and pharmacy confirmed History of Present Illness: Monty Crook is a 38 y.o. female here for follow up of thyroid. Weight up 6 lbs since last visit in 10/18. Read my e-mail after last visit but forgot to start the crestor and is willing to start now. Has not been taking the phentermine regularly due to worsening anxiety but has been taking topamax 1 tab at night. Still taking metformin 3 tabs per day along with the arthur, hctz and penny-pro. Has been trying to exercise at least 1-2 times a week. Had labs drawn yesterday that are pending. Current Outpatient Medications Medication Sig  topiramate (TOPAMAX) 50 mg tablet Take 1 tablet at bedtime  estrogen, conjugated,-medroxyPROGESTERone (PREMPRO) 0.625-2.5 mg per tablet TAKE ONE TABLET BY MOUTH ONCE DAILY  spironolactone (ALDACTONE) 100 mg tablet TAKE ONE TABLET BY MOUTH ONCE DAILY  metFORMIN ER (GLUCOPHAGE XR) 500 mg tablet TAKE THREE TABLETS BY MOUTH ONCE DAILY AT DINNER  
 hydroCHLOROthiazide (MICROZIDE) 12.5 mg capsule TAKE ONE CAPSULE BY MOUTH ONCE DAILY  b complex vitamins tablet Take 1 Tab by mouth daily.  OTHER,NON-FORMULARY, 1,000 mg. Flax seed  CALCIUM & MAGNESIUM CARBONATES PO Take  by mouth two (2) times a day.  ASPIRIN/ACETAMINOPHEN/CAFFEINE (EXCEDRIN MIGRAINE PO) Take  by mouth as needed.  rosuvastatin (CRESTOR) 5 mg tablet Take 1 Tab by mouth nightly.  phentermine (ADIPEX-P) 37.5 mg tablet Take 1/2 tablet before breakfast every other day No current facility-administered medications for this visit. Allergies Allergen Reactions  Macrobid [Nitrofurantoin Monohyd/M-Cryst] Rash and Itching Review of Systems: 
- Cardiovascular: no chest pain 
- Neurological: no tremors - Integumentary: skin is normal 
 
Physical Examination: 
Blood pressure 130/84, pulse 66, weight 208 lb 3.2 oz (94.4 kg), last menstrual period 10/11/2012. - General: pleasant, no distress, good eye contact  
- Neck: no thyromegaly or thyroid bruits - Cardiovascular: regular, normal rate, nl s1 and s2, no m/r/g - Respiratory: clear to auscultation bilaterally - Integumentary: skin is normal, no edema 
- Neurological: reflexes 2+ at biceps, no tremors - Psychiatric: normal mood and affect Data Reviewed:  
- none new for review Assessment/Plan: 1. Abnormal thyroid function test: She had a low free T4 in the setting of a low TSH in August 2010 but on repeat her TSH was normal in Dec 2010 and July 2011. She had another low TSH of 0.37 in May 2012 but this was normal on repeat in August 2012 and 8/13. Then low again at 0.38 in 10/13. Up to 0.425 in 4/14 but down to 0.29 in 9/14. Up to 0.48 in 2/15 and 0.52 in 12/15 and 0.58 in 8/16 and 0.57 in 9/17 and 0.49 in 10/18. I think her low TSH values were either lab errors or possibly from stress rather than from hyperthyroidism as she doesn't have any symptoms of hyperthyroidism.  
- repeat TSH in 10/19 2. PCOS (polycystic ovarian syndrome): I thought a lot of her symptoms of irregular periods and hair growth may be from PCOS so I began treatment for this with Metformin in May 2012. She is tolerating this so far at 3 tabs daily so will stay at this dose. Increased the arthur from 25 to 50 daily in 8/12 to help with hair growth but this hasn't made too much difference so increased to 100 in 9/14 but still hasn't helped too much. Testosterone was 34 in 8/12 and 35 in 3/12 and 34 in 8/13 and 26 in 9/13 and 14.6 in 9/14 and 21 in 12/15 and 12.6 in 9/17. 
- cont Metformin  mg 3 tabs at dinner  
- cont spironolactone 100 mg daily,  
- check total testosterone in 4/20 
   
3. Other and unspecified hyperlipidemia: Given PCOS, Goal LDL < 100, non-HDL < 130, and TG < 150.  in May 2012.   Increased simva from 20 to 40 at that time but  in 8/12 as she had been non-compliant with this.  in 8/13 but down to 132 in 10/13 and 130 in 4/14 and 141 in 9/14 and 151 in 2/15 and 146 in 12/15. Has been on red yeast rice 1200 mg per day the past 2 months but LDL still 150 in 8/16 but down to 138 in 9/17 with wt loss. Wanted her to go up to 3 tabs per day but only was taking 2 per day until 1 month ago and then ran out and never refilled and  in 10/18 and wanted her to start crestor 5 mg daily but forgot to do this and will start now. - begin crestor 5 mg daily daily - check lipids and cmp prior to next visit 4. Impaired glucose tolerance:  Her A1c was 6% in 8/12 and stable at 6% in 8/13 but up to 6.1% in 10/13. Down to 6% in 4/14 and stable in 9/14. Up to 6.2% in 2/15 but down to 6% in 12/15. Up to 6.1% in 8/16 but down to 5.8% in 9/17 with wt loss. Up to 5.9% in 10/18 
- check A1c and cmp prior to next visit 
- cont metformin as above 5. Obesity:  Had lost 9 lbs from 10/13 to 4/14 with P37 but stopped for the next 4 months and weight up 1 lb by 2/15 and 16 lbs by 12/15. Down 6 lbs by 4/16 so added topamax. Has been under more stress and had gained 8 lbs back by 8/16 so decreased topamax back to 1 tab daily and added back phentermine and wt down 9 lbs by 9/17. Up 3 lbs by 10/18 but has been off phentermine for the past 6 months and up 6 lbs by 4/19 
- cont topamax 50 mg at bedtime  
- off phentermine 1 tab daily 6. Premature ovarian failure: has documented high LH/FSH levels in 12/10 and apparently her OB checked them again and confirmed this. I think given her early age of going into menopause, she needs to be on estrogen-progesterone combo to help protect her bones over the next 10 years. I started prempro in 7/13. Switched to generic estrace and provera in 9/14 for cost but didn't feel as well so went back to penny-pro.   May have had worsening depression and anxiety in the fall of 2015 due to being off this for a period of 2 months as her mood is better with being back on this. - cont prempro 0.625/2.5 mg daily Patient Instructions 1) I will send you a message through [employer] with your lab results. 2) Plan on starting crestor 5 mg 1 tab at night for cholesterol. Watch out for any muscle aches or cramps with this medication and let me know if they develop. We will watch your liver tests very carefully while on this medication. 3) Your blood pressure is at goal. 
 
4) I will mail you a lab slip. Please put this in the glove compartment and I will send you a reminder to have your labs drawn prior to the next visit. Follow-up and Dispositions · Return in about 6 months (around 10/17/2019). Copy sent to: Dr. Rodriguez Lab follow up: 4/20/19 Component Latest Ref Rng & Units 4/17/2019 4/17/2019 4/17/2019 4/17/2019 10:40 AM 10:40 AM 10:40 AM 10:40 AM  
Glucose 65 - 99 mg/dL   101 (H) BUN 
    6 - 24 mg/dL   7 Creatinine 
    0.57 - 1.00 mg/dL   0.97 GFR est non-AA 
    >59 mL/min/1.73   71 GFR est AA 
    >59 mL/min/1.73   82 BUN/Creatinine ratio 9 - 23   7 (L) Sodium 134 - 144 mmol/L   141 Potassium 3.5 - 5.2 mmol/L   4.3 Chloride 96 - 106 mmol/L   103 CO2 
    20 - 29 mmol/L   24 Calcium 8.7 - 10.2 mg/dL   9.6 Protein, total 
    6.0 - 8.5 g/dL   7.6 Albumin 3.5 - 5.5 g/dL   4.3 GLOBULIN, TOTAL 
    1.5 - 4.5 g/dL   3.3 A-G Ratio 1.2 - 2.2   1.3 Bilirubin, total 
    0.0 - 1.2 mg/dL   0.3 Alk. phosphatase 39 - 117 IU/L   65 AST 
    0 - 40 IU/L   12   
ALT (SGPT) 0 - 32 IU/L   15 Cholesterol, total 
    100 - 199 mg/dL  202 (H) Triglyceride 0 - 149 mg/dL  79 HDL Cholesterol >39 mg/dL  55 VLDL, calculated 5 - 40 mg/dL  16 LDL, calculated 0 - 99 mg/dL  131 (H) Hemoglobin A1c, (calculated) 4.8 - 5.6 % 5.8 (H) Estimated average glucose 
    mg/dL 120 Testosterone, Serum (Total) 
    ng/dL    19.9 Sent her the following message through [employer]: 
 
Hemoglobin A1c is a 3 month marker of your blood sugar control. Normal is less than 5.7% and diabetes is greater than 6.4%. Your Hemoglobin A1c is 5.8% which means your blood sugar is still in the borderline diabetic range but under better control than last check when your value was 5.9%. Continue to work on your diet and exercise and take all your medications as directed. 
------------------------------------------------------------------------------------------------------------------- Total Cholesterol is the total number of cholesterol particles in your blood. Goal is less than 200. Triglycerides are the short term fats in your blood. Goal is less than 150. HDL is the good cholesterol in your blood. Goal is more than 50 if you are a woman and 40 if you are a man. LDL is the bad cholesterol in your blood. Goal is less than 100 unless you have heart disease and then goal is under 70. Your cholesterol is still above goal as expected but hopefully with starting the rosuvastatin (crestor), we'll be able to get your cholesterol to goal. 
 
Continue to follow a low cholesterol diet. Try to limit the amount of fried foods, fatty foods, butter, gravy, red meat, ice cream, cheese, and eggs in your diet, which are all high in cholesterol. 
------------------------------------------------------------------------------------------------------------------- 
BUN and creatinine are markers of kidney function. Your values are normal. 
------------------------------------------------------------------------------------------------------------------- 
ALT and AST are markers of liver function.   Your values are normal. 
------------------------------------------------------------------------------------------------------------------- 
 Your testosterone is normal and at goal under 30 so your PCOS is controlled with the metformin.

## 2019-04-20 DIAGNOSIS — R73.02 IMPAIRED GLUCOSE TOLERANCE: ICD-10-CM

## 2019-04-20 DIAGNOSIS — E28.2 PCOS (POLYCYSTIC OVARIAN SYNDROME): ICD-10-CM

## 2019-04-20 DIAGNOSIS — R94.6 ABNORMAL THYROID FUNCTION TEST: Primary | ICD-10-CM

## 2019-04-20 DIAGNOSIS — E78.5 HYPERLIPIDEMIA LDL GOAL <100: ICD-10-CM

## 2019-04-20 LAB
ALBUMIN SERPL-MCNC: 4.3 G/DL (ref 3.5–5.5)
ALBUMIN/GLOB SERPL: 1.3 {RATIO} (ref 1.2–2.2)
ALP SERPL-CCNC: 65 IU/L (ref 39–117)
ALT SERPL-CCNC: 15 IU/L (ref 0–32)
AST SERPL-CCNC: 12 IU/L (ref 0–40)
BILIRUB SERPL-MCNC: 0.3 MG/DL (ref 0–1.2)
BUN SERPL-MCNC: 7 MG/DL (ref 6–24)
BUN/CREAT SERPL: 7 (ref 9–23)
CALCIUM SERPL-MCNC: 9.6 MG/DL (ref 8.7–10.2)
CHLORIDE SERPL-SCNC: 103 MMOL/L (ref 96–106)
CHOLEST SERPL-MCNC: 202 MG/DL (ref 100–199)
CO2 SERPL-SCNC: 24 MMOL/L (ref 20–29)
CREAT SERPL-MCNC: 0.97 MG/DL (ref 0.57–1)
EST. AVERAGE GLUCOSE BLD GHB EST-MCNC: 120 MG/DL
GLOBULIN SER CALC-MCNC: 3.3 G/DL (ref 1.5–4.5)
GLUCOSE SERPL-MCNC: 101 MG/DL (ref 65–99)
HBA1C MFR BLD: 5.8 % (ref 4.8–5.6)
HDLC SERPL-MCNC: 55 MG/DL
INTERPRETATION, 910389: NORMAL
LDLC SERPL CALC-MCNC: 131 MG/DL (ref 0–99)
POTASSIUM SERPL-SCNC: 4.3 MMOL/L (ref 3.5–5.2)
PROT SERPL-MCNC: 7.6 G/DL (ref 6–8.5)
SODIUM SERPL-SCNC: 141 MMOL/L (ref 134–144)
TESTOST SERPL-MCNC: 19.9 NG/DL
TRIGL SERPL-MCNC: 79 MG/DL (ref 0–149)
VLDLC SERPL CALC-MCNC: 16 MG/DL (ref 5–40)

## 2019-09-23 RX ORDER — METFORMIN HYDROCHLORIDE 500 MG/1
TABLET, EXTENDED RELEASE ORAL
Qty: 90 TAB | Refills: 11 | Status: SHIPPED | OUTPATIENT
Start: 2019-09-23 | End: 2021-04-21 | Stop reason: SDUPTHER

## 2019-12-03 RX ORDER — SPIRONOLACTONE 100 MG/1
TABLET, FILM COATED ORAL
Qty: 90 TAB | Refills: 3 | Status: SHIPPED | OUTPATIENT
Start: 2019-12-03 | End: 2021-01-02

## 2020-04-08 ENCOUNTER — VIRTUAL VISIT (OUTPATIENT)
Dept: ENDOCRINOLOGY | Age: 45
End: 2020-04-08

## 2020-04-08 DIAGNOSIS — R94.6 ABNORMAL THYROID FUNCTION TEST: Primary | ICD-10-CM

## 2020-04-08 DIAGNOSIS — R73.02 IMPAIRED GLUCOSE TOLERANCE: ICD-10-CM

## 2020-04-08 DIAGNOSIS — E66.9 OBESITY (BMI 30-39.9): ICD-10-CM

## 2020-04-08 DIAGNOSIS — E28.2 PCOS (POLYCYSTIC OVARIAN SYNDROME): ICD-10-CM

## 2020-04-08 DIAGNOSIS — E78.5 HYPERLIPIDEMIA LDL GOAL <100: ICD-10-CM

## 2020-04-08 DIAGNOSIS — E28.319 PREMATURE MENOPAUSE: ICD-10-CM

## 2020-04-08 RX ORDER — TOPIRAMATE 50 MG/1
TABLET, FILM COATED ORAL
Qty: 180 TAB | Refills: 3 | Status: SHIPPED | OUTPATIENT
Start: 2020-04-08 | End: 2021-04-21 | Stop reason: SDUPTHER

## 2020-04-08 RX ORDER — PHENTERMINE HYDROCHLORIDE 37.5 MG/1
TABLET ORAL
Qty: 45 TAB | Refills: 1 | Status: SHIPPED | OUTPATIENT
Start: 2020-04-08 | End: 2021-04-21 | Stop reason: SDUPTHER

## 2020-04-08 RX ORDER — ROSUVASTATIN CALCIUM 5 MG/1
5 TABLET, COATED ORAL
Qty: 90 TAB | Refills: 3 | Status: SHIPPED | OUTPATIENT
Start: 2020-04-08 | End: 2021-04-21 | Stop reason: SDUPTHER

## 2020-04-08 NOTE — LETTER
4/23/2020 7:09 AM 
 
Ms. Dinah Quevedo [address] [place] [id number] Since you haven't read the message I sent you on 4/8/20 through [address], I wanted to send you a letter with the message: 
 
1) Increase the topamax (topiramate) to 1.5 tabs at bedtime for 1-2 weeks and then up to 2 tabs at bedtime.  This will be ready for  at the [address] [facility]. 2) Continue taking phentermine 1/2 tab every other day.  I sent this to [facility]ap[facility] Sible[facility].  
 
3) Please begin crestor (rosuvastatin) 5 mg at bedtime.  Watch out for any muscle aches or cramps with this medication and let me know if they develop.  We will watch your liver tests very carefully while on this medication. 4) Please come for a follow up visit on 10/21/20 at 11:10am in our [facility] office. 5) I will mail you a lab slip (already done).  Please put this in the glove compartment or other safe spot where you keep your medical papers and I will send you a reminder to have your labs drawn prior to next visit.   
 
 
 
 
 
Sincerely, 
 
 
Stephanie Prado MD

## 2020-04-08 NOTE — PROGRESS NOTES
Chief Complaint   Patient presents with    Thyroid Problem     pcp and pharmacy confirmed    Other     Doxy Iphone       **THIS IS A VIRTUAL VISIT VIA A VIDEO SYNCHRONOUS DISCUSSION. PATIENT AGREED TO HAVE THEIR CARE DELIVERED OVER A DOXY. ME VIDEO VISIT IN PLACE OF THEIR REGULARLY SCHEDULED OFFICE VISIT**    History of Present Illness: Orlando Francois is a 54 y.o. female here for follow up of thyroid and PCOS. No major change to health since last visit. Again forgot to start the crestor but is willing to start now. Weight is up to about 216 on recent check up from 208 in 4/19. Has been taking phentermine 1/2 tab every other day and topamax 1 tab at bedtime. Willing to try to increased the topamax up to 2 tabs at bedtime to help with weight as she can't tolerate more phentermine due to worsening anxiety. Compliant with metformin and arthur for PCOS but still having some facial hair growth. Compliant with penny-pro. Current Outpatient Medications   Medication Sig    estrogen, conjugated,-medroxyPROGESTERone (PREMPRO) 0.625-2.5 mg per tablet TAKE 1 TABLET BY MOUTH ONCE DAILY    spironolactone (ALDACTONE) 100 mg tablet TAKE 1 TABLET BY MOUTH ONCE DAILY    metFORMIN ER (GLUCOPHAGE XR) 500 mg tablet TAKE 3 TABLETS BY MOUTH ONCE DAILY AT DINNER    hydroCHLOROthiazide (MICROZIDE) 12.5 mg capsule TAKE 1 CAPSULE BY MOUTH ONCE DAILY    topiramate (TOPAMAX) 50 mg tablet Take 1 tablet at bedtime    b complex vitamins tablet Take 1 Tab by mouth daily.  OTHER,NON-FORMULARY, 1,000 mg. Flax seed    CALCIUM & MAGNESIUM CARBONATES PO Take  by mouth two (2) times a day.  ASPIRIN/ACETAMINOPHEN/CAFFEINE (EXCEDRIN MIGRAINE PO) Take  by mouth as needed.  rosuvastatin (CRESTOR) 5 mg tablet Take 1 Tab by mouth nightly. No current facility-administered medications for this visit.       Allergies   Allergen Reactions    Macrobid [Nitrofurantoin Monohyd/M-Cryst] Rash and Itching     Review of Systems: PER HPI    Physical Examination:  - GENERAL: NCAT, Appears well nourished   - EYES: EOMI, non-icteric, no proptosis   - Ear/Nose/Throat: NCAT, no visible inflammation or masses   - CARDIOVASCULAR: no cyanosis, no visible JVD   - RESPIRATORY: respiratory effort normal without any distress or labored breathing   - MUSCULOSKELETAL: Normal ROM of neck and upper extremities observed   - SKIN: No rash on face  - NEUROLOGIC:  No facial asymmetry (Cranial nerve 7 motor function), No gaze palsy   - PSYCHIATRIC: Normal affect, Normal insight and judgement       Data Reviewed:   - none new for review    Assessment/Plan:     1. Abnormal thyroid function test: She had a low free T4 in the setting of a low TSH in August 2010 but on repeat her TSH was normal in Dec 2010 and July 2011. She had another low TSH of 0.37 in May 2012 but this was normal on repeat in August 2012 and 8/13. Then low again at 0.38 in 10/13. Up to 0.425 in 4/14 but down to 0.29 in 9/14. Up to 0.48 in 2/15 and 0.52 in 12/15 and 0.58 in 8/16 and 0.57 in 9/17 and 0.49 in 10/18. I think her low TSH values were either lab errors or possibly from stress rather than from hyperthyroidism as she doesn't have any symptoms of hyperthyroidism.   - repeat TSH prior to next visit       2. PCOS (polycystic ovarian syndrome): I thought a lot of her symptoms of irregular periods and hair growth may be from PCOS so I began treatment for this with Metformin in May 2012. She is tolerating this so far at 3 tabs daily so will stay at this dose. Increased the arthur from 25 to 50 daily in 8/12 to help with hair growth but this hasn't made too much difference so increased to 100 in 9/14 but still hasn't helped too much.   Testosterone was 34 in 8/12 and 35 in 3/12 and 34 in 8/13 and 26 in 9/13 and 14.6 in 9/14 and 21 in 12/15 and 12.6 in 9/17 and 19 in 4/19  - cont Metformin  mg 3 tabs at dinner   - cont spironolactone 100 mg daily,   - check total testosterone prior to next visit        3. Other and unspecified hyperlipidemia: Given PCOS, Goal LDL < 100, non-HDL < 130, and TG < 150.  in May 2012. Increased simva from 20 to 40 at that time but  in 8/12 as she had been non-compliant with this.  in 8/13 but down to 132 in 10/13 and 130 in 4/14 and 141 in 9/14 and 151 in 2/15 and 146 in 12/15. Has been on red yeast rice 1200 mg per day the past 2 months but LDL still 150 in 8/16 but down to 138 in 9/17 with wt loss. Wanted her to go up to 3 tabs per day but only was taking 2 per day until 1 month ago and then ran out and never refilled and  in 10/18 and wanted her to start crestor 5 mg daily but forgot to do this and  in 4/19 and wanted her to start then but still didn't so will start now. - begin crestor 5 mg daily daily  - check lipids and cmp prior to next visit         4. Impaired glucose tolerance:  Her A1c was 6% in 8/12 and stable at 6% in 8/13 but up to 6.1% in 10/13. Down to 6% in 4/14 and stable in 9/14. Up to 6.2% in 2/15 but down to 6% in 12/15. Up to 6.1% in 8/16 but down to 5.8% in 9/17 with wt loss. Up to 5.9% in 10/18. Down to 5.8% in 4/19  - check A1c and cmp prior to next visit  - cont metformin as above    5. Obesity:  Had lost 9 lbs from 10/13 to 4/14 with P37 but stopped for the next 4 months and weight up 1 lb by 2/15 and 16 lbs by 12/15. Down 6 lbs by 4/16 so added topamax. Has been under more stress and had gained 8 lbs back by 8/16 so decreased topamax back to 1 tab daily and added back phentermine and wt down 9 lbs by 9/17. Up 3 lbs by 10/18 but has been off phentermine for the past 6 months and up 6 lbs by 4/19 and 8 lbs by 4/20. Will increase topamax  - increase topamax 50 mg to 1.5 tabs at bedtime for 1-2 weeks and then 2 tabs at bedtime  - cont phentermine 37.5 mg 1/2 tab every other day    6.   Premature ovarian failure: has documented high LH/FSH levels in 12/10 and apparently her OB checked them again and confirmed this. I think given her early age of going into menopause, she needs to be on estrogen-progesterone combo to help protect her bones over the next 10 years. I started prempro in 7/13. Switched to generic estrace and provera in 9/14 for cost but didn't feel as well so went back to penny-pro. May have had worsening depression and anxiety in the fall of 2015 due to being off this for a period of 2 months as her mood is better with being back on this. - cont prempro 0.625/2.5 mg daily    Patient Instructions   1) Increase the topamax (topiramate) to 1.5 tabs at bedtime for 1-2 weeks and then up to 2 tabs at bedtime. This will be ready for  at the [address] [facility]  today. 2) Continue taking phentermine 1/2 tab every other day. I sent this to [employer].    3) Please begin crestor (rosuvastatin) 5 mg at bedtime. Watch out for any muscle aches or cramps with this medication and let me know if they develop. We will watch your liver tests very carefully while on this medication. 4) Please come for a follow up visit on 10/21/20 at 11:10am in our [facility] office. 5) I will mail you a lab slip. Please put this in the glove compartment or other safe spot where you keep your medical papers and I will send you a reminder to have your labs drawn prior to next visit.           Follow-up and Dispositions    · Return for 10/21/20 at 11:10am.               Copy sent to:  Dr. Winston Mccann

## 2020-04-08 NOTE — PATIENT INSTRUCTIONS
1) Increase the topamax (topiramate) to 1.5 tabs at bedtime for 1-2 weeks and then up to 2 tabs at bedtime. This will be ready for  at the [facility] today. 2) Continue taking phentermine 1/2 tab every other day. I sent this to [employer]. 
 
3) Please begin crestor (rosuvastatin) 5 mg at bedtime. Watch out for any muscle aches or cramps with this medication and let me know if they develop. We will watch your liver tests very carefully while on this medication. 4) Please come for a follow up visit on 10/21/20 at 11:10am in our [facility] office. 5) I will mail you a lab slip. Please put this in the glove compartment or other safe spot where you keep your medical papers and I will send you a reminder to have your labs drawn prior to next visit.

## 2020-06-27 RX ORDER — HYDROCHLOROTHIAZIDE 12.5 MG/1
CAPSULE ORAL
Qty: 90 CAP | Refills: 3 | Status: SHIPPED | OUTPATIENT
Start: 2020-06-27 | End: 2021-04-21 | Stop reason: SDUPTHER

## 2020-10-07 DIAGNOSIS — E28.2 PCOS (POLYCYSTIC OVARIAN SYNDROME): ICD-10-CM

## 2020-10-07 DIAGNOSIS — E66.9 OBESITY (BMI 30-39.9): ICD-10-CM

## 2020-10-07 DIAGNOSIS — R94.6 ABNORMAL THYROID FUNCTION TEST: ICD-10-CM

## 2020-10-07 DIAGNOSIS — E28.319 PREMATURE MENOPAUSE: ICD-10-CM

## 2020-10-07 DIAGNOSIS — R73.02 IMPAIRED GLUCOSE TOLERANCE: ICD-10-CM

## 2020-10-07 DIAGNOSIS — E78.5 HYPERLIPIDEMIA LDL GOAL <100: ICD-10-CM

## 2020-10-21 ENCOUNTER — VIRTUAL VISIT (OUTPATIENT)
Dept: ENDOCRINOLOGY | Age: 45
End: 2020-10-21
Payer: COMMERCIAL

## 2020-10-21 DIAGNOSIS — E66.9 OBESITY (BMI 30-39.9): ICD-10-CM

## 2020-10-21 DIAGNOSIS — R73.02 IMPAIRED GLUCOSE TOLERANCE: ICD-10-CM

## 2020-10-21 DIAGNOSIS — R94.6 ABNORMAL THYROID FUNCTION TEST: ICD-10-CM

## 2020-10-21 DIAGNOSIS — E28.2 PCOS (POLYCYSTIC OVARIAN SYNDROME): Primary | ICD-10-CM

## 2020-10-21 DIAGNOSIS — E28.319 PREMATURE MENOPAUSE: ICD-10-CM

## 2020-10-21 DIAGNOSIS — E78.5 HYPERLIPIDEMIA LDL GOAL <100: ICD-10-CM

## 2020-10-21 PROCEDURE — 99214 OFFICE O/P EST MOD 30 MIN: CPT | Performed by: INTERNAL MEDICINE

## 2020-10-21 NOTE — PROGRESS NOTES
Chief Complaint   Patient presents with    Thyroid Problem     pcp and pharmacy confirmed    PCOS     [phone] doxy-iphone       **THIS IS A VIRTUAL VISIT VIA A VIDEO SYNCHRONOUS DISCUSSION. PATIENT AGREED TO HAVE THEIR CARE DELIVERED OVER A DOXPaice. ME VIDEO VISIT IN PLACE OF THEIR REGULARLY SCHEDULED OFFICE VISIT**    History of Present Illness: Selvin Reynolds is a 54 y.o. female here for follow up of thyroid. Taking the crestor 5 mg daily and has some mild increase in frequency of muscle cramps with taking this every other day. Has been taking topamax 1 tab at bedtime and tends to get 1/2 tab every other morning. Weight was 216 in 4/20 and down to 209 currently. Compliant with penny-pro, metformin and spironolactone. Gets phentermine 1/2 tab every other day. Not going to the gym but tries to walk about 60 min every other day. Going for labs tomorrow as apparently there was a hold on her account due to her son owing a bill and he plans on paying this today. Current Outpatient Medications   Medication Sig    hydroCHLOROthiazide (MICROZIDE) 12.5 mg capsule Take 1 capsule by mouth once daily    phentermine (ADIPEX-P) 37.5 mg tablet Take 1/2 tablet every other day    rosuvastatin (CRESTOR) 5 mg tablet Take 1 Tab by mouth nightly.  topiramate (Topamax) 50 mg tablet Take 2 tablets at bedtime    estrogen, conjugated,-medroxyPROGESTERone (PREMPRO) 0.625-2.5 mg per tablet TAKE 1 TABLET BY MOUTH ONCE DAILY    spironolactone (ALDACTONE) 100 mg tablet TAKE 1 TABLET BY MOUTH ONCE DAILY    metFORMIN ER (GLUCOPHAGE XR) 500 mg tablet TAKE 3 TABLETS BY MOUTH ONCE DAILY AT DINNER    b complex vitamins tablet Take 1 Tab by mouth daily.  OTHER,NON-FORMULARY, 1,000 mg. Flax seed    CALCIUM & MAGNESIUM CARBONATES PO Take  by mouth two (2) times a day.  ASPIRIN/ACETAMINOPHEN/CAFFEINE (EXCEDRIN MIGRAINE PO) Take  by mouth as needed. No current facility-administered medications for this visit.       Allergies Allergen Reactions    Macrobid [Nitrofurantoin Monohyd/M-Cryst] Rash and Itching     Review of Systems: PER HPI    Physical Examination:  - GENERAL: NCAT, Appears well nourished   - EYES: EOMI, non-icteric, no proptosis   - Ear/Nose/Throat: NCAT, no visible inflammation or masses   - CARDIOVASCULAR: no cyanosis, no visible JVD   - RESPIRATORY: respiratory effort normal without any distress or labored breathing   - MUSCULOSKELETAL: Normal ROM of neck and upper extremities observed   - SKIN: No rash on face  - NEUROLOGIC:  No facial asymmetry (Cranial nerve 7 motor function), No gaze palsy   - PSYCHIATRIC: Normal affect, Normal insight and judgement       Data Reviewed:   - none new for review    Assessment/Plan:     1. Abnormal thyroid function test: She had a low free T4 in the setting of a low TSH in August 2010 but on repeat her TSH was normal in Dec 2010 and July 2011. She had another low TSH of 0.37 in May 2012 but this was normal on repeat in August 2012 and 8/13. Then low again at 0.38 in 10/13. Up to 0.425 in 4/14 but down to 0.29 in 9/14. Up to 0.48 in 2/15 and 0.52 in 12/15 and 0.58 in 8/16 and 0.57 in 9/17 and 0.49 in 10/18. I think her low TSH values were either lab errors or possibly from stress rather than from hyperthyroidism as she doesn't have any symptoms of hyperthyroidism.   - repeat TSH now       2. PCOS (polycystic ovarian syndrome): I thought a lot of her symptoms of irregular periods and hair growth may be from PCOS so I began treatment for this with Metformin in May 2012. She is tolerating this so far at 3 tabs daily so will stay at this dose. Increased the arthur from 25 to 50 daily in 8/12 to help with hair growth but this hasn't made too much difference so increased to 100 in 9/14 but still hasn't helped too much.   Testosterone was 34 in 8/12 and 35 in 3/12 and 34 in 8/13 and 26 in 9/13 and 14.6 in 9/14 and 21 in 12/15 and 12.6 in 9/17 and 19 in 4/19  - cont Metformin  mg 3 tabs at dinner   - cont spironolactone 100 mg daily,   - check total testosterone now        3. Other and unspecified hyperlipidemia: Given PCOS, Goal LDL < 100, non-HDL < 130, and TG < 150.  in May 2012. Increased simva from 20 to 40 at that time but  in 8/12 as she had been non-compliant with this.  in 8/13 but down to 132 in 10/13 and 130 in 4/14 and 141 in 9/14 and 151 in 2/15 and 146 in 12/15. Has been on red yeast rice 1200 mg per day the past 2 months but LDL still 150 in 8/16 but down to 138 in 9/17 with wt loss. Wanted her to go up to 3 tabs per day but only was taking 2 per day until 1 month ago and then ran out and never refilled and  in 10/18 and wanted her to start crestor 5 mg daily but forgot to do this and  in 4/19 and wanted her to start then but still didn't so began in 4/20 but only taking 1 tab every other day  - cont crestor 5 mg every other day  - check lipids and cmp now         4. Impaired glucose tolerance:  Her A1c was 6% in 8/12 and stable at 6% in 8/13 but up to 6.1% in 10/13. Down to 6% in 4/14 and stable in 9/14. Up to 6.2% in 2/15 but down to 6% in 12/15. Up to 6.1% in 8/16 but down to 5.8% in 9/17 with wt loss. Up to 5.9% in 10/18. Down to 5.8% in 4/19  - check A1c and cmp now  - cont metformin as above    5. Obesity:  Had lost 9 lbs from 10/13 to 4/14 with P37 but stopped for the next 4 months and weight up 1 lb by 2/15 and 16 lbs by 12/15. Down 6 lbs by 4/16 so added topamax. Has been under more stress and had gained 8 lbs back by 8/16 so decreased topamax back to 1 tab daily and added back phentermine and wt down 9 lbs by 9/17. Up 3 lbs by 10/18 but has been off phentermine for the past 6 months and up 6 lbs by 4/19 and 8 lbs by 4/20. I increased topamax to 1.5 tabs/day and wt down 7 lbs by 10/20  - cont topamax 50 mg 1.5 tabs daily  - cont phentermine 37.5 mg 1/2 tab every other day    6.   Premature ovarian failure: has documented high LH/FSH levels in 12/10 and apparently her OB checked them again and confirmed this. I think given her early age of going into menopause, she needs to be on estrogen-progesterone combo to help protect her bones over the next 10 years. I started prempro in 7/13. Switched to generic estrace and provera in 9/14 for cost but didn't feel as well so went back to penny-pro. May have had worsening depression and anxiety in the fall of 2015 due to being off this for a period of 2 months as her mood is better with being back on this.   - cont prempro 0.625/2.5 mg daily         Follow-up and Dispositions    · Return 4/21/21 at 10:30am.           Copy sent to:  Dr. Priscilla White

## 2021-01-02 RX ORDER — SPIRONOLACTONE 100 MG/1
TABLET, FILM COATED ORAL
Qty: 90 TAB | Refills: 3 | Status: SHIPPED | OUTPATIENT
Start: 2021-01-02

## 2021-04-21 ENCOUNTER — VIRTUAL VISIT (OUTPATIENT)
Dept: ENDOCRINOLOGY | Age: 46
End: 2021-04-21
Payer: COMMERCIAL

## 2021-04-21 DIAGNOSIS — E28.319 PREMATURE MENOPAUSE: ICD-10-CM

## 2021-04-21 DIAGNOSIS — E28.2 PCOS (POLYCYSTIC OVARIAN SYNDROME): Primary | ICD-10-CM

## 2021-04-21 DIAGNOSIS — R94.6 ABNORMAL THYROID FUNCTION TEST: ICD-10-CM

## 2021-04-21 DIAGNOSIS — E78.5 HYPERLIPIDEMIA LDL GOAL <100: ICD-10-CM

## 2021-04-21 DIAGNOSIS — R73.02 IMPAIRED GLUCOSE TOLERANCE: ICD-10-CM

## 2021-04-21 DIAGNOSIS — E66.9 OBESITY (BMI 30-39.9): ICD-10-CM

## 2021-04-21 PROCEDURE — 99214 OFFICE O/P EST MOD 30 MIN: CPT | Performed by: INTERNAL MEDICINE

## 2021-04-21 RX ORDER — HYDROCHLOROTHIAZIDE 12.5 MG/1
CAPSULE ORAL
Qty: 90 CAP | Refills: 3 | Status: SHIPPED | OUTPATIENT
Start: 2021-04-21

## 2021-04-21 RX ORDER — TOPIRAMATE 50 MG/1
TABLET, FILM COATED ORAL
Qty: 135 TAB | Refills: 3 | Status: SHIPPED | OUTPATIENT
Start: 2021-04-21

## 2021-04-21 RX ORDER — METFORMIN HYDROCHLORIDE 500 MG/1
TABLET, EXTENDED RELEASE ORAL
Qty: 270 TAB | Refills: 3 | Status: SHIPPED | OUTPATIENT
Start: 2021-04-21

## 2021-04-21 RX ORDER — ROSUVASTATIN CALCIUM 5 MG/1
5 TABLET, COATED ORAL EVERY OTHER DAY
Qty: 45 TAB | Refills: 3 | Status: SHIPPED | OUTPATIENT
Start: 2021-04-21 | End: 2021-10-27

## 2021-04-21 RX ORDER — TOPIRAMATE 50 MG/1
TABLET, FILM COATED ORAL
Qty: 135 TAB | Refills: 3 | Status: SHIPPED | OUTPATIENT
Start: 2021-04-21 | End: 2021-04-21 | Stop reason: SDUPTHER

## 2021-04-21 RX ORDER — METFORMIN HYDROCHLORIDE 500 MG/1
TABLET, EXTENDED RELEASE ORAL
Qty: 270 TAB | Refills: 3 | Status: SHIPPED | OUTPATIENT
Start: 2021-04-21 | End: 2021-04-21 | Stop reason: SDUPTHER

## 2021-04-21 RX ORDER — HYDROCHLOROTHIAZIDE 12.5 MG/1
CAPSULE ORAL
Qty: 90 CAP | Refills: 3 | Status: SHIPPED | OUTPATIENT
Start: 2021-04-21 | End: 2021-04-21 | Stop reason: SDUPTHER

## 2021-04-21 RX ORDER — PHENTERMINE HYDROCHLORIDE 37.5 MG/1
TABLET ORAL
Qty: 45 TAB | Refills: 1 | Status: SHIPPED
Start: 2021-04-21 | End: 2021-10-27 | Stop reason: DRUGHIGH

## 2021-04-21 RX ORDER — ROSUVASTATIN CALCIUM 5 MG/1
5 TABLET, COATED ORAL EVERY OTHER DAY
Qty: 45 TAB | Refills: 3 | Status: SHIPPED | OUTPATIENT
Start: 2021-04-21 | End: 2021-04-21 | Stop reason: SDUPTHER

## 2021-04-21 NOTE — PROGRESS NOTES
Chief Complaint   Patient presents with    Thyroid Problem     [phone] doxy-Iphone    Other     pcp and pharmacy confirmed       **THIS IS A VIRTUAL VISIT VIA A VIDEO SYNCHRONOUS DISCUSSION. PATIENT AGREED TO HAVE THEIR CARE DELIVERED OVER A [employer] ME VIDEO VISIT IN PLACE OF THEIR REGULARLY SCHEDULED OFFICE VISIT**    History of Present Illness: Earlene Harris is a 51 y.o. female here for follow up of thyroid. Weight is stable around 209 lbs. Has been out of the phentermine for the past 2 months and prior to that was taking 1/2 tab every other day. Still taking 1.5 tabs of topamax at night. Still taking crestor every other night. Gets the metformin 3 tabs daily and arthur 1 tab daily and penny-pro 1 tab daily. Had labs drawn 2 days ago that are pending. Current Outpatient Medications   Medication Sig    spironolactone (ALDACTONE) 100 mg tablet Take 1 tablet by mouth once daily    hydroCHLOROthiazide (MICROZIDE) 12.5 mg capsule Take 1 capsule by mouth once daily    phentermine (ADIPEX-P) 37.5 mg tablet Take 1/2 tablet every other day    rosuvastatin (CRESTOR) 5 mg tablet Take 1 Tab by mouth nightly.  topiramate (Topamax) 50 mg tablet Take 2 tablets at bedtime    estrogen, conjugated,-medroxyPROGESTERone (PREMPRO) 0.625-2.5 mg per tablet TAKE 1 TABLET BY MOUTH ONCE DAILY    metFORMIN ER (GLUCOPHAGE XR) 500 mg tablet TAKE 3 TABLETS BY MOUTH ONCE DAILY AT DINNER    b complex vitamins tablet Take 1 Tab by mouth daily.  OTHER,NON-FORMULARY, 1,000 mg. Flax seed    CALCIUM & MAGNESIUM CARBONATES PO Take  by mouth two (2) times a day.  ASPIRIN/ACETAMINOPHEN/CAFFEINE (EXCEDRIN MIGRAINE PO) Take  by mouth as needed. No current facility-administered medications for this visit.       Allergies   Allergen Reactions    Macrobid [Nitrofurantoin Monohyd/M-Cryst] Rash and Itching     Review of Systems: PER HPI    Physical Examination:  - GENERAL: NCAT, Appears well nourished   - EYES: EOMI, non-icteric, no proptosis   - Ear/Nose/Throat: NCAT, no visible inflammation or masses   - CARDIOVASCULAR: no cyanosis, no visible JVD   - RESPIRATORY: respiratory effort normal without any distress or labored breathing   - MUSCULOSKELETAL: Normal ROM of neck and upper extremities observed   - SKIN: No rash on face  - NEUROLOGIC:  No facial asymmetry (Cranial nerve 7 motor function), No gaze palsy   - PSYCHIATRIC: Normal affect, Normal insight and judgement       Data Reviewed:   - none new for review    Assessment/Plan:     1. Abnormal thyroid function test: She had a low free T4 in the setting of a low TSH in August 2010 but on repeat her TSH was normal in Dec 2010 and July 2011. She had another low TSH of 0.37 in May 2012 but this was normal on repeat in August 2012 and 8/13. Then low again at 0.38 in 10/13. Up to 0.425 in 4/14 but down to 0.29 in 9/14. Up to 0.48 in 2/15 and 0.52 in 12/15 and 0.58 in 8/16 and 0.57 in 9/17 and 0.49 in 10/18. I think her low TSH values were either lab errors or possibly from stress rather than from hyperthyroidism as she doesn't have any symptoms of hyperthyroidism.   - repeat TSH in 4/22       2. PCOS (polycystic ovarian syndrome): I thought a lot of her symptoms of irregular periods and hair growth may be from PCOS so I began treatment for this with Metformin in May 2012. She is tolerating this so far at 3 tabs daily so will stay at this dose. Increased the arthur from 25 to 50 daily in 8/12 to help with hair growth but this hasn't made too much difference so increased to 100 in 9/14 but still hasn't helped too much. Testosterone was 34 in 8/12 and 35 in 3/12 and 34 in 8/13 and 26 in 9/13 and 14.6 in 9/14 and 21 in 12/15 and 12.6 in 9/17 and 19 in 4/19  - cont Metformin  mg 3 tabs at dinner   - cont spironolactone 100 mg daily,   - check total testosterone in 4/22        3. Other and unspecified hyperlipidemia: Given PCOS, Goal LDL < 100, non-HDL < 130, and TG < 150.  in May 2012. Increased simva from 20 to 40 at that time but  in 8/12 as she had been non-compliant with this.  in 8/13 but down to 132 in 10/13 and 130 in 4/14 and 141 in 9/14 and 151 in 2/15 and 146 in 12/15. Has been on red yeast rice 1200 mg per day the past 2 months but LDL still 150 in 8/16 but down to 138 in 9/17 with wt loss. Wanted her to go up to 3 tabs per day but only was taking 2 per day until 1 month ago and then ran out and never refilled and  in 10/18 and wanted her to start crestor 5 mg daily but forgot to do this and  in 4/19 and wanted her to start then but still didn't so began in 4/20 but only taking 1 tab every other day  - cont crestor 5 mg every other day  - check lipids and cmp prior to next visit         4. Impaired glucose tolerance:  Her A1c was 6% in 8/12 and stable at 6% in 8/13 but up to 6.1% in 10/13. Down to 6% in 4/14 and stable in 9/14. Up to 6.2% in 2/15 but down to 6% in 12/15. Up to 6.1% in 8/16 but down to 5.8% in 9/17 with wt loss. Up to 5.9% in 10/18. Down to 5.8% in 4/19  - check A1c and cmp prior to next visit  - cont metformin as above    5. Obesity:  Had lost 9 lbs from 10/13 to 4/14 with P37 but stopped for the next 4 months and weight up 1 lb by 2/15 and 16 lbs by 12/15. Down 6 lbs by 4/16 so added topamax. Has been under more stress and had gained 8 lbs back by 8/16 so decreased topamax back to 1 tab daily and added back phentermine and wt down 9 lbs by 9/17. Up 3 lbs by 10/18 but has been off phentermine for the past 6 months and up 6 lbs by 4/19 and 8 lbs by 4/20. I increased topamax to 1.5 tabs/day and wt down 7 lbs by 10/20 and stable by 4/21  - cont topamax 50 mg 1.5 tabs daily  - cont phentermine 37.5 mg 1/2 tab every other day    6. Premature ovarian failure: has documented high LH/FSH levels in 12/10 and apparently her OB checked them again and confirmed this.   I think given her early age of going into menopause, she needs to be on estrogen-progesterone combo to help protect her bones over the next 10 years. I started prempro in 7/13. Switched to generic estrace and provera in 9/14 for cost but didn't feel as well so went back to penny-pro. May have had worsening depression and anxiety in the fall of 2015 due to being off this for a period of 2 months as her mood is better with being back on this. - cont prempro 0.625/2.5 mg daily      Follow-up and Dispositions    · Return 10/27/21 at 10:30am.               Copy sent to:  Dr. Charissa Clemons    Lab follow up: 4/29/21  Component      Latest Ref Rng & Units 4/22/2021   Glucose      65 - 99 mg/dL 108 (H)   BUN      6 - 24 mg/dL 13   Creatinine      0.57 - 1.00 mg/dL 1.07 (H)   GFR est non-AA      >59 mL/min/1.73 62   GFR est AA      >59 mL/min/1.73 71   BUN/Creatinine ratio      9 - 23 12   Sodium      134 - 144 mmol/L 138   Potassium      3.5 - 5.2 mmol/L 4.5   Chloride      96 - 106 mmol/L 99   CO2      20 - 29 mmol/L 23   Calcium      8.7 - 10.2 mg/dL 9.6   Protein, total      6.0 - 8.5 g/dL 7.9   Albumin      3.8 - 4.8 g/dL 4.3   GLOBULIN, TOTAL      1.5 - 4.5 g/dL 3.6   A-G Ratio      1.2 - 2.2 1.2   Bilirubin, total      0.0 - 1.2 mg/dL 0.2   Alk. phosphatase      39 - 117 IU/L 64   AST      0 - 40 IU/L 13   ALT      0 - 32 IU/L 14   Cholesterol, total      100 - 199 mg/dL 218 (H)   Triglyceride      0 - 149 mg/dL 97   HDL Cholesterol      >39 mg/dL 56   VLDL, calculated      5 - 40 mg/dL 17   LDL, calculated      0 - 99 mg/dL 145 (H)   Hemoglobin A1c, (calculated)      4.8 - 5.6 % 6.2 (H)   Estimated average glucose      mg/dL 131   TSH      0.450 - 4.500 uIU/mL 0.757   Testosterone, Serum (Total)      ng/dL 12.6     Sent her the following message through MyChart:    Hemoglobin A1c is a 3 month marker of your blood sugar control. Normal is less than 5.7% and diabetes is greater than 6.4%.   Your Hemoglobin A1c is 6.2% which means your blood sugar is still in the borderline diabetic range and under worse control than last check when your value was 5.8%. Continue to work on your diet and exercise and take all your medications as directed.  -------------------------------------------------------------------------------------------------------------------  Total Cholesterol is the total number of cholesterol particles in your blood. Goal is less than 200. Triglycerides are the short term fats in your blood. Goal is less than 150. HDL is the good cholesterol in your blood. Goal is more than 50 if you are a woman and 40 if you are a man. LDL is the bad cholesterol in your blood. Goal is less than 100 unless you have a history of heart disease or stroke and then goal is under 70. Your cholesterol is higher than at last check. Continue to follow a low cholesterol diet. Try to limit the amount of fried foods, fatty foods, butter, gravy, red meat, ice cream, cheese, and eggs in your diet, which are all high in cholesterol.  -------------------------------------------------------------------------------------------------------------------  BUN and creatinine are markers of kidney function. Your values are normal. Your creatinine (kidney test) was slightly high due to mild dehydration. Drink at least 4 8oz glasses of water everyday to stay hydrated to prevent your creatinine level from going higher. -------------------------------------------------------------------------------------------------------------------  ALT and AST are markers of liver function. Your values are normal.  -------------------------------------------------------------------------------------------------------------------  TSH is a thyroid test.  Your level is normal so you don't have any problem with your thyroid function at this time that needs further evaluation or treatment. -------------------------------------------------------------------------------------------------------------------  Your testosterone is normal at 12 and down from 19 at last check so your PCOS is still well controlled.

## 2021-04-26 LAB
ALBUMIN SERPL-MCNC: 4.3 G/DL (ref 3.8–4.8)
ALBUMIN/GLOB SERPL: 1.2 {RATIO} (ref 1.2–2.2)
ALP SERPL-CCNC: 64 IU/L (ref 39–117)
ALT SERPL-CCNC: 14 IU/L (ref 0–32)
AST SERPL-CCNC: 13 IU/L (ref 0–40)
BILIRUB SERPL-MCNC: 0.2 MG/DL (ref 0–1.2)
BUN SERPL-MCNC: 13 MG/DL (ref 6–24)
BUN/CREAT SERPL: 12 (ref 9–23)
CALCIUM SERPL-MCNC: 9.6 MG/DL (ref 8.7–10.2)
CHLORIDE SERPL-SCNC: 99 MMOL/L (ref 96–106)
CHOLEST SERPL-MCNC: 218 MG/DL (ref 100–199)
CO2 SERPL-SCNC: 23 MMOL/L (ref 20–29)
CREAT SERPL-MCNC: 1.07 MG/DL (ref 0.57–1)
EST. AVERAGE GLUCOSE BLD GHB EST-MCNC: 131 MG/DL
GLOBULIN SER CALC-MCNC: 3.6 G/DL (ref 1.5–4.5)
GLUCOSE SERPL-MCNC: 108 MG/DL (ref 65–99)
HBA1C MFR BLD: 6.2 % (ref 4.8–5.6)
HDLC SERPL-MCNC: 56 MG/DL
IMP & REVIEW OF LAB RESULTS: NORMAL
LDLC SERPL CALC-MCNC: 145 MG/DL (ref 0–99)
POTASSIUM SERPL-SCNC: 4.5 MMOL/L (ref 3.5–5.2)
PROT SERPL-MCNC: 7.9 G/DL (ref 6–8.5)
SODIUM SERPL-SCNC: 138 MMOL/L (ref 134–144)
TESTOST SERPL-MCNC: 12.6 NG/DL
TRIGL SERPL-MCNC: 97 MG/DL (ref 0–149)
TSH SERPL DL<=0.005 MIU/L-ACNC: 0.76 UIU/ML (ref 0.45–4.5)
VLDLC SERPL CALC-MCNC: 17 MG/DL (ref 5–40)

## 2021-04-29 NOTE — ADDENDUM NOTE
Addended by: Dottie Dillon on: 4/29/2021 01:51 PM     Modules accepted: Orders Patient requests that her iron be sent to [facility] in [place]. Rx sent.

## 2021-10-13 DIAGNOSIS — E28.2 PCOS (POLYCYSTIC OVARIAN SYNDROME): ICD-10-CM

## 2021-10-13 DIAGNOSIS — E28.319 PREMATURE MENOPAUSE: ICD-10-CM

## 2021-10-13 DIAGNOSIS — R94.6 ABNORMAL THYROID FUNCTION TEST: ICD-10-CM

## 2021-10-13 DIAGNOSIS — E78.5 HYPERLIPIDEMIA LDL GOAL <100: ICD-10-CM

## 2021-10-13 DIAGNOSIS — R73.02 IMPAIRED GLUCOSE TOLERANCE: ICD-10-CM

## 2021-10-13 DIAGNOSIS — E66.9 OBESITY (BMI 30-39.9): ICD-10-CM

## 2021-10-27 ENCOUNTER — OFFICE VISIT (OUTPATIENT)
Dept: ENDOCRINOLOGY | Age: 46
End: 2021-10-27
Payer: COMMERCIAL

## 2021-10-27 VITALS
HEART RATE: 66 BPM | HEIGHT: 66 IN | WEIGHT: 212.2 LBS | SYSTOLIC BLOOD PRESSURE: 131 MMHG | BODY MASS INDEX: 34.1 KG/M2 | DIASTOLIC BLOOD PRESSURE: 83 MMHG

## 2021-10-27 DIAGNOSIS — R94.6 ABNORMAL THYROID FUNCTION TEST: ICD-10-CM

## 2021-10-27 DIAGNOSIS — E28.2 PCOS (POLYCYSTIC OVARIAN SYNDROME): Primary | ICD-10-CM

## 2021-10-27 DIAGNOSIS — E66.9 OBESITY (BMI 30-39.9): ICD-10-CM

## 2021-10-27 DIAGNOSIS — E28.319 PREMATURE MENOPAUSE: ICD-10-CM

## 2021-10-27 DIAGNOSIS — E78.5 HYPERLIPIDEMIA LDL GOAL <100: ICD-10-CM

## 2021-10-27 DIAGNOSIS — R73.02 IMPAIRED GLUCOSE TOLERANCE: ICD-10-CM

## 2021-10-27 LAB — HBA1C MFR BLD HPLC: 6.3 %

## 2021-10-27 PROCEDURE — 83036 HEMOGLOBIN GLYCOSYLATED A1C: CPT | Performed by: INTERNAL MEDICINE

## 2021-10-27 PROCEDURE — 99214 OFFICE O/P EST MOD 30 MIN: CPT | Performed by: INTERNAL MEDICINE

## 2021-10-27 RX ORDER — PHENTERMINE HYDROCHLORIDE 15 MG/1
15 CAPSULE ORAL
Qty: 90 CAPSULE | Refills: 1 | Status: SHIPPED | OUTPATIENT
Start: 2021-10-27

## 2021-10-27 RX ORDER — ROSUVASTATIN CALCIUM 5 MG/1
5 TABLET, COATED ORAL
Qty: 90 TABLET | Refills: 3 | Status: SHIPPED | OUTPATIENT
Start: 2021-10-27

## 2021-10-27 NOTE — PROGRESS NOTES
Chief Complaint   Patient presents with    Thyroid Problem     pcp and pharmacy confirmed     History of Present Illness: Parth Parker is a 51 y.o. female here for follow up of diabetes and thyroid. Was admitted for 3 days in 9/21 at [address] [place] [address] for diverticulitis and was treated with bowel rest and abx and has to f/u with a GI doctor to do a colonoscopy and has started taking metamucil daily alternating with miralax. Did stop the crestor when she was in the hospital but has been taking every other day since being home. Still taking topamax 1.5 tabs at bedtime. Was having worsening anxiety over the summer so stopped the phentermine at that time but willing to try the 15 mg caps instead. Also willing to increase her crestor. Has been eating more carbs than she should since stopping working in June 2021. Current Outpatient Medications   Medication Sig    psyllium (METAMUCIL) powd Take  by mouth daily.  phentermine (ADIPEX-P) 37.5 mg tablet Take 1/2 tablet every other day    estrogen, conjugated,-medroxyPROGESTERone (PREMPRO) 0.625-2.5 mg per tablet TAKE 1 TABLET BY MOUTH ONCE DAILY    metFORMIN ER (GLUCOPHAGE XR) 500 mg tablet TAKE 3 TABLETS BY MOUTH ONCE DAILY AT DINNER    topiramate (Topamax) 50 mg tablet Take 1.5 tablets at bedtime    rosuvastatin (CRESTOR) 5 mg tablet Take 1 Tab by mouth every other day.  hydroCHLOROthiazide (MICROZIDE) 12.5 mg capsule Take 1 capsule by mouth once daily    spironolactone (ALDACTONE) 100 mg tablet Take 1 tablet by mouth once daily    b complex vitamins tablet Take 1 Tab by mouth daily.  OTHER,NON-FORMULARY, 1,000 mg. Flax seed    CALCIUM & MAGNESIUM CARBONATES PO Take  by mouth two (2) times a day.  ASPIRIN/ACETAMINOPHEN/CAFFEINE (EXCEDRIN MIGRAINE PO) Take  by mouth as needed. No current facility-administered medications for this visit.      Allergies   Allergen Reactions    Macrobid [Nitrofurantoin Monohyd/M-Cryst] Rash and Itching     Review of Systems: PER HPI    Physical Examination:  Blood pressure 131/83, pulse 66, height 5' 6\" (1.676 m), weight 212 lb 3.2 oz (96.3 kg), last menstrual period 10/11/2012.  - General: pleasant, no distress, good eye contact   - Neck: no carotid bruits  - Cardiovascular: regular, normal rate, nl s1 and s2, no m/r/g,   - Respiratory: clear bilaterally  - Integumentary: no edema,   - Psychiatric: normal mood and affect    Data Reviewed:   Component      Latest Ref Rng & Units 10/27/2021          11:01 AM   Hemoglobin A1c (POC)      % 6.3       Assessment/Plan:     1. Abnormal thyroid function test: She had a low free T4 in the setting of a low TSH in August 2010 but on repeat her TSH was normal in Dec 2010 and July 2011. She had another low TSH of 0.37 in May 2012 but this was normal on repeat in August 2012 and 8/13. Then low again at 0.38 in 10/13. Up to 0.425 in 4/14 but down to 0.29 in 9/14. Up to 0.48 in 2/15 and 0.52 in 12/15 and 0.58 in 8/16 and 0.57 in 9/17 and 0.49 in 10/18 and 0.75 in 4/21. I think her low TSH values were either lab errors or possibly from stress rather than from hyperthyroidism as she doesn't have any symptoms of hyperthyroidism.   - repeat TSH prior to next visit       2. PCOS (polycystic ovarian syndrome): I thought a lot of her symptoms of irregular periods and hair growth may be from PCOS so I began treatment for this with Metformin in May 2012. She is tolerating this so far at 3 tabs daily so will stay at this dose. Increased the arthur from 25 to 50 daily in 8/12 to help with hair growth but this hasn't made too much difference so increased to 100 in 9/14 but still hasn't helped too much. Testosterone was 34 in 8/12 and 35 in 3/12 and 34 in 8/13 and 26 in 9/13 and 14.6 in 9/14 and 21 in 12/15 and 12.6 in 9/17 and 19 in 4/19 and 12.6 in 4/21  - cont Metformin  mg 3 tabs at dinner   - cont spironolactone 100 mg daily,   - check total testosterone prior to next visit          3.  Other and unspecified hyperlipidemia: Given PCOS, Goal LDL < 100, non-HDL < 130, and TG < 150.  in May 2012. Increased simva from 20 to 40 at that time but  in 8/12 as she had been non-compliant with this.  in 8/13 but down to 132 in 10/13 and 130 in 4/14 and 141 in 9/14 and 151 in 2/15 and 146 in 12/15. Has been on red yeast rice 1200 mg per day the past 2 months but LDL still 150 in 8/16 but down to 138 in 9/17 with wt loss. Wanted her to go up to 3 tabs per day but only was taking 2 per day until 1 month ago and then ran out and never refilled and  in 10/18 and wanted her to start crestor 5 mg daily but forgot to do this and  in 4/19 and wanted her to start then but still didn't so began in 4/20 but only taking 1 tab every other day and up to 145 in 4/21 so increased to daily dosing in 10/21  - increase crestor to 5 mg every day  - check lipids and cmp prior to next visit         4. Impaired glucose tolerance:  Her A1c was 6% in 8/12 and stable at 6% in 8/13 but up to 6.1% in 10/13. Down to 6% in 4/14 and stable in 9/14. Up to 6.2% in 2/15 but down to 6% in 12/15. Up to 6.1% in 8/16 but down to 5.8% in 9/17 with wt loss. Up to 5.9% in 10/18. Down to 5.8% in 4/19. Up to 6.2% in 4/21 and 6.3% in 10/21.    - check A1c and cmp prior to next visit  - cont metformin as above      5. Obesity:  Had lost 9 lbs from 10/13 to 4/14 with P37 but stopped for the next 4 months and weight up 1 lb by 2/15 and 16 lbs by 12/15. Down 6 lbs by 4/16 so added topamax. Has been under more stress and had gained 8 lbs back by 8/16 so decreased topamax back to 1 tab daily and added back phentermine and wt down 9 lbs by 9/17. Up 3 lbs by 10/18 but has been off phentermine for the past 6 months and up 6 lbs by 4/19 and 8 lbs by 4/20.   I increased topamax to 1.5 tabs/day and wt down 7 lbs by 10/20 and stable by 4/21.  Up 3 lbs to 212 in 10/21 and has not been taking the phentermine due to anxiety with the 37.5 mg tabs even with 1/2 tab so will try 15 mg caps instead  - cont topamax 50 mg 1.5 tabs daily  - change phentermine to 15 mg caps 1 daily      6. Premature ovarian failure: has documented high LH/FSH levels in 12/10 and apparently her OB checked them again and confirmed this. I think given her early age of going into menopause, she needs to be on estrogen-progesterone combo to help protect her bones over the next 10 years. I started prempro in 7/13. Switched to generic estrace and provera in 9/14 for cost but didn't feel as well so went back to penny-pro. May have had worsening depression and anxiety in the fall of 2015 due to being off this for a period of 2 months as her mood is better with being back on this. - cont prempro 0.625/2.5 mg daily    Patient Instructions   1) We will try the phentermine 15 mg capsule in place of the 37.5 mg tablet to see if you can tolerate this better without worsening anxiety. Try to take this everyday if possible to get your weight back down. Take the goodrx coupon with the prescription to [facility]. 2) Hemoglobin A1c is a 3 month marker of your blood sugar control. Normal is less than 5.7% and diabetes is greater than 6.4%. Your Hemoglobin A1c is 6.3% which means your blood sugar is still in the borderline diabetic range and under slightly worse control than last check when your value was 6.2%. Continue to work on your diet and exercise and take all your medications as directed. 3) Try not to eat more than 45 grams of carbs per meal (1 palm/fist sized serving = 30 grams; carbs are potatoes, rice, pasta, bread, fruit, corn, peas, cereal, desserts)    4) To be more aggressive with your cholesterol, try to take the rosuvastatin (crestor) everyday. Follow-up and Dispositions    · Return in about 6 months (around 4/27/2022).                Copy sent to:  Dr. Higinio Casas

## 2021-10-27 NOTE — PATIENT INSTRUCTIONS
1) We will try the phentermine 15 mg capsule in place of the 37.5 mg tablet to see if you can tolerate this better without worsening anxiety. Try to take this everyday if possible to get your weight back down. Take the goodrx coupon with the prescription to [facility]. 2) Hemoglobin A1c is a 3 month marker of your blood sugar control. Normal is less than 5.7% and diabetes is greater than 6.4%. Your Hemoglobin A1c is 6.3% which means your blood sugar is still in the borderline diabetic range and under slightly worse control than last check when your value was 6.2%. Continue to work on your diet and exercise and take all your medications as directed. 3) Try not to eat more than 45 grams of carbs per meal (1 palm/fist sized serving = 30 grams; carbs are potatoes, rice, pasta, bread, fruit, corn, peas, cereal, desserts)    4) To be more aggressive with your cholesterol, try to take the rosuvastatin (crestor) everyday.

## 2022-04-27 DIAGNOSIS — E78.5 HYPERLIPIDEMIA LDL GOAL <100: ICD-10-CM

## 2022-04-27 DIAGNOSIS — R94.6 ABNORMAL THYROID FUNCTION TEST: ICD-10-CM

## 2022-04-27 DIAGNOSIS — E28.319 PREMATURE MENOPAUSE: ICD-10-CM

## 2022-04-27 DIAGNOSIS — E28.2 PCOS (POLYCYSTIC OVARIAN SYNDROME): ICD-10-CM

## 2022-04-27 DIAGNOSIS — R73.02 IMPAIRED GLUCOSE TOLERANCE: ICD-10-CM

## 2022-04-27 DIAGNOSIS — E66.9 OBESITY (BMI 30-39.9): ICD-10-CM

## 2022-05-15 RX ORDER — SPIRONOLACTONE 100 MG/1
TABLET, FILM COATED ORAL
Qty: 90 TABLET | Refills: 0 | OUTPATIENT
Start: 2022-05-15

## 2024-07-05 ENCOUNTER — HOSPITAL ENCOUNTER (EMERGENCY)
Facility: HOSPITAL | Age: 49
Discharge: HOME | End: 2024-07-06
Attending: EMERGENCY MEDICINE
Payer: COMMERCIAL

## 2024-07-05 ENCOUNTER — APPOINTMENT (OUTPATIENT)
Facility: HOSPITAL | Age: 49
End: 2024-07-05
Payer: COMMERCIAL

## 2024-07-05 VITALS
RESPIRATION RATE: 18 BRPM | BODY MASS INDEX: 35.36 KG/M2 | SYSTOLIC BLOOD PRESSURE: 165 MMHG | HEART RATE: 92 BPM | DIASTOLIC BLOOD PRESSURE: 80 MMHG | OXYGEN SATURATION: 100 % | HEIGHT: 66 IN | TEMPERATURE: 98.5 F | WEIGHT: 220 LBS

## 2024-07-05 DIAGNOSIS — D72.829 LEUKOCYTOSIS, UNSPECIFIED TYPE: ICD-10-CM

## 2024-07-05 DIAGNOSIS — R10.32 ACUTE LEFT LOWER QUADRANT PAIN: Primary | ICD-10-CM

## 2024-07-05 DIAGNOSIS — I16.0 HYPERTENSIVE URGENCY: ICD-10-CM

## 2024-07-05 DIAGNOSIS — K80.20 CALCULUS OF GALLBLADDER WITHOUT CHOLECYSTITIS WITHOUT OBSTRUCTION: ICD-10-CM

## 2024-07-05 DIAGNOSIS — E86.0 ACUTE DEHYDRATION: ICD-10-CM

## 2024-07-05 DIAGNOSIS — K59.00 CONSTIPATION, UNSPECIFIED CONSTIPATION TYPE: ICD-10-CM

## 2024-07-05 LAB
ALBUMIN SERPL-MCNC: 3.9 G/DL (ref 3.5–5)
ALBUMIN/GLOB SERPL: 0.8 (ref 1.1–2.2)
ALP SERPL-CCNC: 74 U/L (ref 45–117)
ALT SERPL-CCNC: 24 U/L (ref 12–78)
ANION GAP SERPL CALC-SCNC: 6 MMOL/L (ref 5–15)
APPEARANCE UR: CLEAR
AST SERPL-CCNC: 19 U/L (ref 15–37)
BACTERIA URNS QL MICRO: NEGATIVE /HPF
BASOPHILS # BLD: 0.1 K/UL (ref 0–0.1)
BASOPHILS NFR BLD: 1 % (ref 0–1)
BILIRUB SERPL-MCNC: 0.6 MG/DL (ref 0.2–1)
BILIRUB UR QL: NEGATIVE
BUN SERPL-MCNC: 10 MG/DL (ref 6–20)
BUN/CREAT SERPL: 9 (ref 12–20)
CALCIUM SERPL-MCNC: 9.5 MG/DL (ref 8.5–10.1)
CHLORIDE SERPL-SCNC: 100 MMOL/L (ref 97–108)
CO2 SERPL-SCNC: 29 MMOL/L (ref 21–32)
COLOR UR: NORMAL
CREAT SERPL-MCNC: 1.16 MG/DL (ref 0.55–1.02)
DIFFERENTIAL METHOD BLD: ABNORMAL
EOSINOPHIL # BLD: 0.1 K/UL (ref 0–0.4)
EOSINOPHIL NFR BLD: 0 % (ref 0–7)
EPITH CASTS URNS QL MICRO: NORMAL /LPF
ERYTHROCYTE [DISTWIDTH] IN BLOOD BY AUTOMATED COUNT: 12.9 % (ref 11.5–14.5)
GLOBULIN SER CALC-MCNC: 4.8 G/DL (ref 2–4)
GLUCOSE SERPL-MCNC: 105 MG/DL (ref 65–100)
GLUCOSE UR STRIP.AUTO-MCNC: NEGATIVE MG/DL
HCT VFR BLD AUTO: 39.2 % (ref 35–47)
HGB BLD-MCNC: 14.2 G/DL (ref 11.5–16)
HGB UR QL STRIP: NEGATIVE
IMM GRANULOCYTES # BLD AUTO: 0.1 K/UL (ref 0–0.04)
IMM GRANULOCYTES NFR BLD AUTO: 0 % (ref 0–0.5)
KETONES UR QL STRIP.AUTO: NEGATIVE MG/DL
LEUKOCYTE ESTERASE UR QL STRIP.AUTO: NEGATIVE
LIPASE SERPL-CCNC: 44 U/L (ref 13–75)
LYMPHOCYTES # BLD: 3.7 K/UL (ref 0.8–3.5)
LYMPHOCYTES NFR BLD: 30 % (ref 12–49)
MCH RBC QN AUTO: 29.2 PG (ref 26–34)
MCHC RBC AUTO-ENTMCNC: 36.2 G/DL (ref 30–36.5)
MCV RBC AUTO: 80.7 FL (ref 80–99)
MONOCYTES # BLD: 0.8 K/UL (ref 0–1)
MONOCYTES NFR BLD: 7 % (ref 5–13)
NEUTS SEG # BLD: 7.6 K/UL (ref 1.8–8)
NEUTS SEG NFR BLD: 62 % (ref 32–75)
NITRITE UR QL STRIP.AUTO: NEGATIVE
NRBC # BLD: 0 K/UL (ref 0–0.01)
NRBC BLD-RTO: 0 PER 100 WBC
PH UR STRIP: 6.5 (ref 5–8)
PLATELET # BLD AUTO: 343 K/UL (ref 150–400)
PMV BLD AUTO: 10.9 FL (ref 8.9–12.9)
POTASSIUM SERPL-SCNC: 3.8 MMOL/L (ref 3.5–5.1)
PROT SERPL-MCNC: 8.7 G/DL (ref 6.4–8.2)
PROT UR STRIP-MCNC: NEGATIVE MG/DL
RBC # BLD AUTO: 4.86 M/UL (ref 3.8–5.2)
RBC #/AREA URNS HPF: NORMAL /HPF (ref 0–5)
SODIUM SERPL-SCNC: 135 MMOL/L (ref 136–145)
SP GR UR REFRACTOMETRY: 1.01
URINE CULTURE IF INDICATED: NORMAL
UROBILINOGEN UR QL STRIP.AUTO: 0.2 EU/DL (ref 0.2–1)
WBC # BLD AUTO: 12.2 K/UL (ref 3.6–11)
WBC URNS QL MICRO: NORMAL /HPF (ref 0–4)

## 2024-07-05 PROCEDURE — 36415 COLL VENOUS BLD VENIPUNCTURE: CPT

## 2024-07-05 PROCEDURE — 6360000004 HC RX CONTRAST MEDICATION: Performed by: EMERGENCY MEDICINE

## 2024-07-05 PROCEDURE — 99285 EMERGENCY DEPT VISIT HI MDM: CPT

## 2024-07-05 PROCEDURE — 81001 URINALYSIS AUTO W/SCOPE: CPT

## 2024-07-05 PROCEDURE — 83690 ASSAY OF LIPASE: CPT

## 2024-07-05 PROCEDURE — 74177 CT ABD & PELVIS W/CONTRAST: CPT

## 2024-07-05 PROCEDURE — 80053 COMPREHEN METABOLIC PANEL: CPT

## 2024-07-05 PROCEDURE — 2580000003 HC RX 258: Performed by: EMERGENCY MEDICINE

## 2024-07-05 PROCEDURE — 96360 HYDRATION IV INFUSION INIT: CPT

## 2024-07-05 PROCEDURE — 85025 COMPLETE CBC W/AUTO DIFF WBC: CPT

## 2024-07-05 RX ORDER — ONDANSETRON 2 MG/ML
4 INJECTION INTRAMUSCULAR; INTRAVENOUS
Status: DISCONTINUED | OUTPATIENT
Start: 2024-07-05 | End: 2024-07-05

## 2024-07-05 RX ORDER — 0.9 % SODIUM CHLORIDE 0.9 %
1000 INTRAVENOUS SOLUTION INTRAVENOUS ONCE
Status: COMPLETED | OUTPATIENT
Start: 2024-07-05 | End: 2024-07-06

## 2024-07-05 RX ORDER — MORPHINE SULFATE 4 MG/ML
4 INJECTION, SOLUTION INTRAMUSCULAR; INTRAVENOUS ONCE AS NEEDED
Status: DISCONTINUED | OUTPATIENT
Start: 2024-07-05 | End: 2024-07-06 | Stop reason: HOSPADM

## 2024-07-05 RX ORDER — KETOROLAC TROMETHAMINE 30 MG/ML
30 INJECTION, SOLUTION INTRAMUSCULAR; INTRAVENOUS
Status: DISCONTINUED | OUTPATIENT
Start: 2024-07-05 | End: 2024-07-06 | Stop reason: HOSPADM

## 2024-07-05 RX ADMIN — IOPAMIDOL 100 ML: 755 INJECTION, SOLUTION INTRAVENOUS at 23:51

## 2024-07-05 RX ADMIN — SODIUM CHLORIDE 1000 ML: 9 INJECTION, SOLUTION INTRAVENOUS at 22:50

## 2024-07-05 ASSESSMENT — ENCOUNTER SYMPTOMS
ABDOMINAL PAIN: 1
RHINORRHEA: 0
SHORTNESS OF BREATH: 0
SORE THROAT: 0
VOMITING: 0
EYE PAIN: 0
DIARRHEA: 0
NAUSEA: 0
CONSTIPATION: 1
COUGH: 0

## 2024-07-05 ASSESSMENT — PAIN DESCRIPTION - PAIN TYPE: TYPE: ACUTE PAIN

## 2024-07-05 ASSESSMENT — PAIN SCALES - GENERAL: PAINLEVEL_OUTOF10: 6

## 2024-07-05 ASSESSMENT — LIFESTYLE VARIABLES
HOW MANY STANDARD DRINKS CONTAINING ALCOHOL DO YOU HAVE ON A TYPICAL DAY: PATIENT DOES NOT DRINK
HOW OFTEN DO YOU HAVE A DRINK CONTAINING ALCOHOL: NEVER

## 2024-07-05 ASSESSMENT — PAIN DESCRIPTION - ORIENTATION: ORIENTATION: LOWER

## 2024-07-05 ASSESSMENT — PAIN - FUNCTIONAL ASSESSMENT: PAIN_FUNCTIONAL_ASSESSMENT: 0-10

## 2024-07-05 ASSESSMENT — PAIN DESCRIPTION - LOCATION: LOCATION: ABDOMEN

## 2024-07-05 ASSESSMENT — PAIN DESCRIPTION - DESCRIPTORS: DESCRIPTORS: THROBBING

## 2024-07-06 RX ORDER — MAGNESIUM CITRATE
150 SOLUTION, ORAL ORAL ONCE
Qty: 150 ML | Refills: 1 | Status: SHIPPED | OUTPATIENT
Start: 2024-07-06 | End: 2024-07-06

## 2024-07-06 RX ORDER — DICYCLOMINE HCL 20 MG
20 TABLET ORAL 4 TIMES DAILY
Qty: 20 TABLET | Refills: 0 | Status: SHIPPED | OUTPATIENT
Start: 2024-07-06

## 2024-07-06 RX ORDER — POLYETHYLENE GLYCOL 3350 17 G/17G
17 POWDER, FOR SOLUTION ORAL DAILY PRN
Qty: 510 G | Refills: 0 | Status: SHIPPED | OUTPATIENT
Start: 2024-07-06 | End: 2024-08-05

## 2024-07-06 NOTE — ED TRIAGE NOTES
Pt reports lower abdominal pain with constipation that started today. Pt states she did have a BM earlier this morning. Hx diverticulitis. Denies nausea/vomiting.

## 2024-07-06 NOTE — ED PROVIDER NOTES
EMERGENCY DEPARTMENT HISTORY AND PHYSICAL EXAM            Please note that this dictation was completed with the assistance of \"Dragon\", the computer voice recognition software. Quite often unanticipated grammatical, syntax, homophones, and other interpretive errors are inadvertently transcribed by the computer software. Please disregard these errors and any errors that have escaped final proofreading. Thank you.    Date of Evaluation: 07/06/24  Patient: Jennifer Delarosa  Patient Age and Sex: 49 y.o. female   MRN: [id number]  CSN: [id number]  PCP: Jesus Parnell MD    History of Present Illness     Chief Complaint   Patient presents with    Abdominal Pain     History Provided By: Patient/family/EMS (if available)    History is limited by: Nothing     HPI: Jennifer Delarosa, 49 y.o. female with past medical history as documented below presents to the ED with c/o of 1 day history of lower abdominal pain mostly in the left side.  Notes a history of diverticulitis.  Also reports having constipation.  Last bowel movement 2 days ago.  Reports passing flatus.  Patient is taken no medications here for symptoms.  Denies any prior surgical abdominal history.  Denies any recent illnesses.  No blood in stool.. Pt denies any other exacerbating or ameliorating factors. There are no other complaints, changes or physical findings pertinent to the HPI at this time.    Nursing notes were all reviewed and agreed with or any disagreements were addressed in the HPI.    Past History   Past Medical History:  Past Medical History:   Diagnosis Date    Anxiety 09/26/2015    Diabetes (HCC)     Diverticulitis 09/2021    Hypertension     Migraine     Other and unspecified hyperlipidemia     PCOS (polycystic ovarian syndrome) 5/8/2012    Premature menopause     Thyroid disease     goiter       Past Surgical History:  No past surgical history on file.    Family History:   Family history reviewed and was non-contributory, unless specified  systemic symptoms, impact on quality of life, morbidity and mortality).  Clinical lab tests: ordered as appropriate, reviewed and interpreted by me  Tests in the radiology section of CPT®: ordered as appropriate, reviewed and interpreted by me   Tests in the medicine section of CPT®: ordered as appropriate, reviewed and interpreted by me   Review and summarize past medical records: yes    Risks  OTC drugs.  Prescription drug management.  Parenteral controlled substances.  Drug therapy requiring intensive monitoring for toxicity.  Decision regarding hospitalization.   Social determinants of health, if present addressed per documentation above     Progress Note:  I have just re-evaluated the patient. Pt reports improvement of her symptoms after ED treatment. I have reviewed her vital signs and determined there is currently no worsening in their condition or physical exam. Results have been reviewed with them and their questions have been answered. I will continue to review further results as they come available.     ED Course:  ED Course as of 07/06/24 0448 Fri Jul 05, 2024 2300 I did review endocrine notes, patient seen by Dr. Leija October 27, 2021, noted history of PCOS, hyperlipidemia, obesity.  Patient's medication list includes metformin, Crestor, hydrochlorothiazide. [HW]   2313 Lab work reviewed, CMP with sodium 135, renal function 1.16, normal LFTs.  Urinalysis without concerns for infection, lipase is 44 not consistent pancreatitis.  CBC with mildly elevated white count of 12.2 otherwise no anemia [HW]   2313 Creatinine(!): 1.16  Similar to previous [HW]   Sat Jul 06, 2024 0447 Reviewed CT imaging with patient, incidental finding of gallstones noted without evidence of acute cholecystitis or biliary obstruction.  Will discharge patient home with outpatient surgical follow-up.  Ultrasound imaging currently not indicated at this time as patient does not have any right upper quadrant pain, negative

## 2024-07-06 NOTE — DISCHARGE INSTRUCTIONS
Thank You!    It was a pleasure taking care of you in our Emergency Department today. We know that when you come to [facility], you are entrusting us with your health, comfort, and safety. Our physicians and nurses honor that trust, and truly appreciate the opportunity to care for you and your loved ones.      We also value your feedback. If you receive a survey about your Emergency Department experience today, please fill it out.  We care about our patients' feedback, and we listen to what you have to say. Thank you.    Dr. Thien Sagastume M.D.      ____________________________________________________________________  I have included a copy of your lab results and/or radiologic studies from today's visit so you can have them easily available at your follow-up visit. We hope you feel better and please do not hesitate to contact the ED if you have any questions at all!    Recent Results (from the past 12 hour(s))   CBC with Auto Differential    Collection Time: 07/05/24 10:30 PM   Result Value Ref Range    WBC 12.2 (H) 3.6 - 11.0 K/uL    RBC 4.86 3.80 - 5.20 M/uL    Hemoglobin 14.2 11.5 - 16.0 g/dL    Hematocrit 39.2 35.0 - 47.0 %    MCV 80.7 80.0 - 99.0 FL    MCH 29.2 26.0 - 34.0 PG    MCHC 36.2 30.0 - 36.5 g/dL    RDW 12.9 11.5 - 14.5 %    Platelets 343 150 - 400 K/uL    MPV 10.9 8.9 - 12.9 FL    Nucleated RBCs 0.0 0  WBC    nRBC 0.00 0.00 - 0.01 K/uL    Neutrophils % 62 32 - 75 %    Lymphocytes % 30 12 - 49 %    Monocytes % 7 5 - 13 %    Eosinophils % 0 0 - 7 %    Basophils % 1 0 - 1 %    Immature Granulocytes % 0 0.0 - 0.5 %    Neutrophils Absolute 7.6 1.8 - 8.0 K/UL    Lymphocytes Absolute 3.7 (H) 0.8 - 3.5 K/UL    Monocytes Absolute 0.8 0.0 - 1.0 K/UL    Eosinophils Absolute 0.1 0.0 - 0.4 K/UL    Basophils Absolute 0.1 0.0 - 0.1 K/UL    Immature Granulocytes Absolute 0.1 (H) 0.00 - 0.04 K/UL    Differential Type AUTOMATED     Comprehensive Metabolic Panel    Collection Time:  follow up with a doctor, nurse practitioner, or physician assistant for ongoing care. If your symptoms become worse or you do not improve as expected and you are unable to reach your usual health care provider, you should return to the Emergency Department. We are available 24 hours a day.    Please take your discharge instructions with you when you go to your follow-up appointment.     If a prescription has been provided, please have it filled as soon as possible to prevent a delay in treatment. Read the entire medication instruction sheet provided to you by the pharmacy. If you have any questions or reservations about taking the medication due to side effects or interactions with other medications, please call your primary care physician or contact the ER to speak with the charge nurse.     Please make an appointment with your family doctor or the physician you were referred to for follow-up of this visit as instructed on your discharge paperwork. Return to the ER if you are unable to be seen or if you are unable to be seen in a timely manner.    If you have any problem arranging the follow-up visit, contact the Emergency Department immediately.

## 2024-07-06 NOTE — ED NOTES
Emergency Department Nursing Plan of Care       The Nursing Plan of Care is developed from the Nursing assessment and Emergency Department Attending provider initial evaluation.  The plan of care may be reviewed in the “ED Provider note”.    The Plan of Care was developed with the following considerations:   Patient / Family readiness to learn indicated by:Refer to Medical chart in [facility]  Persons(s) to be included in education: Refer to Medical chart in [facility]  Barriers to Learning/Limitations:Normal    Signed     Cherelle Zayas RN    7/5/2024   10:55 PM